# Patient Record
Sex: FEMALE | Race: BLACK OR AFRICAN AMERICAN | Employment: OTHER | URBAN - METROPOLITAN AREA
[De-identification: names, ages, dates, MRNs, and addresses within clinical notes are randomized per-mention and may not be internally consistent; named-entity substitution may affect disease eponyms.]

---

## 2017-12-05 LAB — CREATININE, EXTERNAL: 0.94

## 2018-03-23 ENCOUNTER — OFFICE VISIT (OUTPATIENT)
Dept: ENDOCRINOLOGY | Age: 83
End: 2018-03-23

## 2018-03-23 VITALS
BODY MASS INDEX: 27.19 KG/M2 | SYSTOLIC BLOOD PRESSURE: 134 MMHG | TEMPERATURE: 97.5 F | RESPIRATION RATE: 14 BRPM | WEIGHT: 144 LBS | DIASTOLIC BLOOD PRESSURE: 64 MMHG | HEART RATE: 63 BPM | HEIGHT: 61 IN

## 2018-03-23 DIAGNOSIS — E04.2 MULTINODULAR GOITER: Primary | ICD-10-CM

## 2018-03-23 DIAGNOSIS — R79.89 LOW TSH LEVEL: ICD-10-CM

## 2018-03-23 RX ORDER — DILTIAZEM HYDROCHLORIDE 90 MG/1
90 TABLET, FILM COATED ORAL 2 TIMES DAILY
COMMUNITY

## 2018-03-23 NOTE — PROGRESS NOTES
HISTORY OF PRESENT ILLNESS  Jinny Matthew is a 80 y.o. female. H[facility]    [facility]     Referred by [facility]     Pt has had goiter for several years and is here for Low TSh 0.3     Pt denies any family h/o thyroid problems   She denies any hyper or hypothyroid symptoms   She denies any prior irradiations or exposure to contrast       She had partial thyroidectomy 25 years ago in [facility]   Then she noticed growth and she had a f/u with dr. Hernesto Avelar 3 years ago       She does nto have any compressive symptoms             Past Medical History:   Diagnosis Date    Diverticulitis     Goiter     HTN (hypertension)        Social History     Social History    Marital status:      Spouse name: N/A    Number of children: N/A    Years of education: N/A     Occupational History    Not on file. Social History Main Topics    Smoking status: Never Smoker    Smokeless tobacco: Never Used    Alcohol use No    Drug use: No    Sexual activity: Not on file     Other Topics Concern    Not on file     Social History Narrative    No narrative on file       History reviewed. No pertinent family history. Review of Systems   Constitutional: Negative. HENT: Negative. Eyes: Negative for pain and redness. Respiratory: Negative. Cardiovascular: Negative for chest pain, palpitations and leg swelling. Gastrointestinal: Negative. Negative for constipation. Genitourinary: Negative. Musculoskeletal: Negative for myalgias. Skin: Negative. Neurological: Negative. Endo/Heme/Allergies: Negative. Psychiatric/Behavioral: Negative for depression and memory loss. The patient does not have insomnia. Physical Exam   Constitutional: She is oriented to person, place, and time. She appears well-developed and well-nourished. HENT:   Head: Normocephalic. Eyes: Conjunctivae and EOM are normal. Pupils are equal, round, and reactive to light. Neck: Normal range of motion. Neck supple. No JVD present. No tracheal deviation present. Thyromegaly (she has right SCM pushed to right - ) present. Cardiovascular: Normal rate, regular rhythm and normal heart sounds. No murmur heard. Pulmonary/Chest: Breath sounds normal.   Abdominal: Soft. Bowel sounds are normal.   Musculoskeletal: Normal range of motion. Lymphadenopathy:     She has no cervical adenopathy. Neurological: She is alert and oriented to person, place, and time. She has normal reflexes. Skin: Skin is warm. Psychiatric: She has a normal mood and affect. TSH is 0.3    ASSESSMENT and PLAN    1. Multinodolar goiter   : right sided enlarged goiter   Likely, she had left side removed     Reviewed surgery notes from 1984 ( pt preserved from then on - she had nodules on left side and likely got operated on left side )     We discussed the natural history of thyroid nodules and the fact that only 5% are malignant. I offered her 3 options: a) pursuing a fine needle aspiration of the nodule, b) expectant management  or c) surgical removal of the goiter. Given that she is not having any compressive symptoms from the nodule, there is no indication to go straight to surgery. Pt herself is nto inclined to get surgery done. This goiter is growing outwards     I reassured her that most thyroid cancers are very slow growing . she decided to pursue an expectant approach and consider biopsy at this time and I think this is a perfectly reasonable option.   She had FNA in MAY  2015 by Dr. Florestine Shone - will obtain path results and thyroid usg     My plan is to get CT neck, thryodi uptake and scan and thyrodi usg after the review of above info     LOW TSH of 0.3 is expected with goiter       > 50 % of time is spent on counseling   Patient voiced understanding her plan of care

## 2018-03-23 NOTE — MR AVS SNAPSHOT
[address] [facility] [address] 
[phone] Patient: [facility] MRN: [id number] KRN:[id number] Visit Information Date & Time Provider Department Dept. Phone Encounter #  
 3/23/2018 11:00 AM Lewanda Lundborg, MD [facility] [phone] [id number] Follow-up Instructions Return in about 2 months (around 5/23/2018). Upcoming Health Maintenance Date Due DTaP/Tdap/Td series (1 - Tdap) 9/6/1956 ZOSTER VACCINE AGE 60> 7/6/1995 GLAUCOMA SCREENING Q2Y 9/6/2000 Bone Densitometry (Dexa) Screening 9/6/2000 Pneumococcal 65+ Low/Medium Risk (1 of 2 - PCV13) 9/6/2000 Influenza Age 5 to Adult 8/1/2017 MEDICARE YEARLY EXAM 3/14/2018 Allergies as of 3/23/2018  Review Complete On: 3/23/2018 By: Lewanda Lundborg, MD  
 No Known Allergies Current Immunizations  Never Reviewed No immunizations on file. Not reviewed this visit You Were Diagnosed With   
  
 Codes Comments Multinodular goiter    -  Primary ICD-10-CM: E04.2 ICD-9-CM: 025. 1 Vitals BP Pulse Temp Resp Height(growth percentile) Weight(growth percentile) 134/64 (BP 1 Location: Right arm, BP Patient Position: Sitting) 63 97.5 °F (36.4 °C) (Oral) 14 5' 1\" (1.549 m) 144 lb (65.3 kg) BMI Smoking Status 27.21 kg/m2 Never Smoker BMI and BSA Data Body Mass Index Body Surface Area  
 27.21 kg/m 2 1.68 m 2 Your Updated Medication List  
  
   
This list is accurate as of 3/23/18 12:13 PM.  Always use your most recent med list.  
  
  
  
  
 dilTIAZem 90 mg tablet Commonly known as:  CARDIZEM Take 90 mg by mouth two (2) times a day. Follow-up Instructions Return in about 2 months (around 5/23/2018). To-Do List   
 03/23/2018 Imaging:  CT NECK SOFT TISSUE WO CONT   
  
 03/23/2018 Imaging:  NM THYROID IMAGE UPT SNGL/MULTI   
  
 03/23/2018 Imaging:  US THYROID/PARATHYROID/SOFT TISS Introducing [facility]! Fabian Romero introduces Visante patient portal. Now you can access parts of your medical record, email your doctor's office, and request medication refills online. 1. In your internet browser, go to [email or web address] 2. Click on the First Time User? Click Here link in the Sign In box. You will see the New Member Sign Up page. 3. Enter your Visante Access Code exactly as it appears below. You will not need to use this code after youve completed the sign-up process. If you do not sign up before the expiration date, you must request a new code. · Visante Access Code: [email or web address] Expires: 6/21/2018 12:08 PM 
 
4. Enter the last four digits of your Social Security Number (xxxx) and Date of Birth (mm/dd/yyyy) as indicated and click Submit. You will be taken to the next sign-up page. 5. Create a Visante ID. This will be your Visante login ID and cannot be changed, so think of one that is secure and easy to remember. 6. Create a Visante password. You can change your password at any time. 7. Enter your Password Reset Question and Answer. This can be used at a later time if you forget your password. 8. Enter your e-mail address. You will receive e-mail notification when new information is available in [address] 9. Click Sign Up. You can now view and download portions of your medical record. 10. Click the Download Summary menu link to download a portable copy of your medical information. If you have questions, please visit the Frequently Asked Questions section of the Visante website. Remember, Visante is NOT to be used for urgent needs. For medical emergencies, dial 911. Now available from your iPhone and Android! Please provide this summary of care documentation to your next provider. Your primary care clinician is listed as Wilton Pennington.  If you have any questions after today's visit, please call [phone].

## 2018-03-23 NOTE — LETTER
3/24/2018 9:06 PM 
 
Patient: [facility] YOB: 1935 Date of Visit: 3/23/2018 Dear Perez Cao MD 
[address] [facility] [address] VIA Facsimile: [phone] 
 : Thank you for referring Ms. [facility] to me for evaluation/treatment. Below are the relevant portions of my assessment and plan of care. Chief Complaint Patient presents with  New Patient  Thyroid Problem HISTORY OF PRESENT ILLNESS [facility] is a 80 y.o. female. HPI Initla visit for thyroid goiter management Referred by pcp Pt has had goiter for several years and is here for Low TSh 0.3 Pt denies any family h/o thyroid problems She denies any hyper or hypothyroid symptoms She denies any prior irradiations or exposure to contrast  
 
 
She had partial thyroidectomy 25 years ago in [facility] Then she noticed growth and she had a f/u with dr. Mary Wallis 3 years ago She does nto have any compressive symptoms Past Medical History:  
Diagnosis Date  Diverticulitis  Goiter  HTN (hypertension) Social History Social History  Marital status:  Spouse name: N/A  
 Number of children: N/A  
 Years of education: N/A Occupational History  Not on file. Social History Main Topics  Smoking status: Never Smoker  Smokeless tobacco: Never Used  Alcohol use No  
 Drug use: No  
 Sexual activity: Not on file Other Topics Concern  Not on file Social History Narrative  No narrative on file History reviewed. No pertinent family history. Review of Systems Constitutional: Negative. HENT: Negative. Eyes: Negative for pain and redness. Respiratory: Negative. Cardiovascular: Negative for chest pain, palpitations and leg swelling. Gastrointestinal: Negative. Negative for constipation. Genitourinary: Negative. Musculoskeletal: Negative for myalgias. Skin: Negative. Neurological: Negative. Endo/Heme/Allergies: Negative. Psychiatric/Behavioral: Negative for depression and memory loss. The patient does not have insomnia. Physical Exam  
Constitutional: She is oriented to person, place, and time. She appears well-developed and well-nourished. HENT:  
Head: Normocephalic. Eyes: Conjunctivae and EOM are normal. Pupils are equal, round, and reactive to light. Neck: Normal range of motion. Neck supple. No JVD present. No tracheal deviation present. Thyromegaly (she has right SCM pushed to right - ) present. Cardiovascular: Normal rate, regular rhythm and normal heart sounds. No murmur heard. Pulmonary/Chest: Breath sounds normal.  
Abdominal: Soft. Bowel sounds are normal.  
Musculoskeletal: Normal range of motion. Lymphadenopathy:  
  She has no cervical adenopathy. Neurological: She is alert and oriented to person, place, and time. She has normal reflexes. Skin: Skin is warm. Psychiatric: She has a normal mood and affect. TSH is 0.3 ASSESSMENT and PLAN 1. Multinodolar goiter   : right sided enlarged goiter Likely, she had left side removed Reviewed surgery notes from 1984 ( pt preserved from then on - she had nodules on left side and likely got operated on left side ) We discussed the natural history of thyroid nodules and the fact that only 5% are malignant. I offered her 3 options: a) pursuing a fine needle aspiration of the nodule, b) expectant management  or c) surgical removal of the goiter. Given that she is not having any compressive symptoms from the nodule, there is no indication to go straight to surgery. Pt herself is nto inclined to get surgery done. This goiter is growing outwards I reassured her that most thyroid cancers are very slow growing . she decided to pursue an expectant approach and consider biopsy at this time and I think this is a perfectly reasonable option.   She had FNA in MAY 2015 by Dr. Tegan Cotter - will obtain path results and thyroid usg My plan is to get CT neck, thryodi uptake and scan and thyrodi usg after the review of above info LOW TSH of 0.3 is expected with goiter  
 
 
> 50 % of time is spent on counseling Patient voiced understanding her plan of care If you have questions, please do not hesitate to call me. I look forward to following Ms. Mcmahon along with you. Sincerely, Melinda Rodgers MD

## 2018-04-10 ENCOUNTER — TELEPHONE (OUTPATIENT)
Dept: ENDOCRINOLOGY | Age: 83
End: 2018-04-10

## 2018-04-10 NOTE — TELEPHONE ENCOUNTER
----- Message from Louisa Salazar sent at 4/10/2018 11:20 AM EDT -----  Regarding: Dr. Brennon Conklin is requesting a call back regarding the thyroid scan and CT scan. Pt contact [phone].

## 2018-04-12 NOTE — TELEPHONE ENCOUNTER
Patient is not sure where she had the test completed. She will look through her medical records and call back with information.

## 2018-04-13 NOTE — TELEPHONE ENCOUNTER
Patient called back stating BX was done with Dr. Guille Raya office on 5/15/2015 and Scan was done at [facility] on 3/30/2015. Sent medical request to both facilty.

## 2018-04-18 NOTE — TELEPHONE ENCOUNTER
Finally, I did receive the records from [facility] on this patient. Ultrasonogram from March 30, 2015 :      she has atrophic left thyroid lobe. She has large right thyroid lobe, heterogeneous with numerous microcalcifications and there was a recommendation for FNA. No dominant nodule was seen. Patient also had FNA on May 15, 2015 and it was benign      I recommend this patient to undergo another ultrasonogram which would be a comparison from before. I can perform the ultrasonogram and the biopsy if needed on one of the Wednesday afternoons.     Can be scheduled for it as a routine

## 2018-06-22 LAB
CREATININE, EXTERNAL: 1.12
LDL-C, EXTERNAL: 90

## 2018-06-26 ENCOUNTER — OFFICE VISIT (OUTPATIENT)
Dept: ENDOCRINOLOGY | Age: 83
End: 2018-06-26

## 2018-06-26 VITALS
HEART RATE: 61 BPM | BODY MASS INDEX: 26.94 KG/M2 | SYSTOLIC BLOOD PRESSURE: 144 MMHG | HEIGHT: 61 IN | RESPIRATION RATE: 18 BRPM | TEMPERATURE: 97.2 F | DIASTOLIC BLOOD PRESSURE: 72 MMHG | OXYGEN SATURATION: 96 % | WEIGHT: 142.7 LBS

## 2018-06-26 DIAGNOSIS — E04.2 MULTINODULAR GOITER (NONTOXIC): Primary | ICD-10-CM

## 2018-06-26 RX ORDER — CIPROFLOXACIN 500 MG/1
500 TABLET ORAL 2 TIMES DAILY
COMMUNITY
Start: 2018-06-25 | End: 2018-07-02

## 2018-06-26 NOTE — MR AVS SNAPSHOT
[address] [facility] [address] 
[phone] Patient: [facility] MRN: [id number] FGI:[id number] Visit Information Date & Time Provider Department Dept. Phone Encounter #  
 6/26/2018  9:30 AM Monica Benitez MD [facility] Diabetes [facility] Endocrinology [phone] [id number] Follow-up Instructions Return in about 6 months (around 12/26/2018). Upcoming Health Maintenance Date Due DTaP/Tdap/Td series (1 - Tdap) 9/6/1956 ZOSTER VACCINE AGE 60> 7/6/1995 GLAUCOMA SCREENING Q2Y 9/6/2000 Bone Densitometry (Dexa) Screening 9/6/2000 Pneumococcal 65+ Low/Medium Risk (1 of 2 - PCV13) 9/6/2000 MEDICARE YEARLY EXAM 3/14/2018 Influenza Age 5 to Adult 8/1/2018 Allergies as of 6/26/2018  Review Complete On: 6/26/2018 By: Monica Benitez MD  
 No Known Allergies Current Immunizations  Never Reviewed No immunizations on file. Not reviewed this visit You Were Diagnosed With   
  
 Codes Comments Multinodular goiter (nontoxic)    -  Primary ICD-10-CM: U95.2 ICD-9-CM: 779. 1 Vitals BP Pulse Temp Resp Height(growth percentile) Weight(growth percentile) 144/72 (BP 1 Location: Right arm, BP Patient Position: Sitting) 61 97.2 °F (36.2 °C) (Oral) 18 5' 1\" (1.549 m) 142 lb 11.2 oz (64.7 kg) SpO2 BMI OB Status Smoking Status 96% 26.96 kg/m2 Postmenopausal Never Smoker BMI and BSA Data Body Mass Index Body Surface Area  
 26.96 kg/m 2 1.67 m 2 Your Updated Medication List  
  
   
This list is accurate as of 6/26/18  9:37 AM.  Always use your most recent med list.  
  
  
  
  
 ciprofloxacin HCl 500 mg tablet Commonly known as:  CIPRO 500 mg two (2) times a day. dilTIAZem 90 mg tablet Commonly known as:  CARDIZEM Take 90 mg by mouth two (2) times a day. Follow-up Instructions Return in about 6 months (around 12/26/2018). To-Do List   
 06/26/2018 Imaging:  CT NECK SOFT TISSUE WO CONT   
  
 06/26/2018 Imaging:  US THYROID/PARATHYROID/SOFT TISS Patient Instructions   
-------------------------------------------------------------------------------------------- Refills    -    please call your pharmacy and have them send us a refill request 
 
Results  -  allow up to a week for lab results to be processed and reviewed. Phone calls  -  Allow upto 24 hrs. for non-urgent calls to be retained Prior authorization - It may take up to 2 weeks to process, depending on your insurance Forms  -  FMLA, DMV, patient assistance, etc. will take up to 2 weeks to process Cancellations - please notify the office in advance if you cannot keep your appointment Samples  - will only be dispensed at visits as supply is limited If you are having a medical emergency call 911 
 
-------------------------------------------------------------------------------------------- Introducing [facility]! Babita Jaime introduces Poachable patient portal. Now you can access parts of your medical record, email your doctor's office, and request medication refills online. 1. In your internet browser, go to [email or web address] 2. Click on the First Time User? Click Here link in the Sign In box. You will see the New Member Sign Up page. 3. Enter your Poachable Access Code exactly as it appears below. You will not need to use this code after youve completed the sign-up process. If you do not sign up before the expiration date, you must request a new code. · Poachable Access Code: [email or web address] Expires: 9/24/2018  9:37 AM 
 
4. Enter the last four digits of your Social Security Number (xxxx) and Date of Birth (mm/dd/yyyy) as indicated and click Submit. You will be taken to the next sign-up page. 5. Create a MarqueeharWebtab ID.  This will be your [employer] login ID and cannot be changed, so think of one that is secure and easy to remember. 6. Create a [employer] password. You can change your password at any time. 7. Enter your Password Reset Question and Answer. This can be used at a later time if you forget your password. 8. Enter your e-mail address. You will receive e-mail notification when new information is available in [address] 9. Click Sign Up. You can now view and download portions of your medical record. 10. Click the Download Summary menu link to download a portable copy of your medical information. If you have questions, please visit the Frequently Asked Questions section of the [employer] website. Remember, [employer] is NOT to be used for urgent needs. For medical emergencies, dial 911. Now available from your iPhone and Android! Please provide this summary of care documentation to your next provider. Your primary care clinician is listed as Marck Mcgraw. If you have any questions after today's visit, please call [phone].

## 2018-06-26 NOTE — PROGRESS NOTES
HISTORY OF PRESENT ILLNESS  Leticia Hughes is a 80 y.o. female. HPI    First  F/u  After initial  visit for thyroid goiter management     She is here to follow up     She has No symptoms   She is confused as to why she is seeing the surgeon and me               Old history :    Referred by pcp     Pt has had goiter for several years and is here for Low TSh 0.3     Pt denies any family h/o thyroid problems   She denies any hyper or hypothyroid symptoms   She denies any prior irradiations or exposure to contrast       She had partial thyroidectomy 25 years ago in [facility]   Then she noticed growth and she had a f/u with dr. Jenny Pan 3 years ago       She does nto have any compressive symptoms             Past Medical History:   Diagnosis Date    Diverticulitis     Goiter     HTN (hypertension)        Social History     Social History    Marital status:      Spouse name: N/A    Number of children: N/A    Years of education: N/A     Occupational History    Not on file. Social History Main Topics    Smoking status: Never Smoker    Smokeless tobacco: Never Used    Alcohol use No    Drug use: No    Sexual activity: Not on file     Other Topics Concern    Not on file     Social History Narrative       History reviewed. No pertinent family history. Review of Systems   Constitutional: Negative. HENT: Negative. Eyes: Negative for pain and redness. Respiratory: Negative. Cardiovascular: Negative for chest pain, palpitations and leg swelling. Gastrointestinal: Negative. Negative for constipation. Genitourinary: Negative. Musculoskeletal: Negative for myalgias. Skin: Negative. Neurological: Negative. Endo/Heme/Allergies: Negative. Psychiatric/Behavioral: Negative for depression and memory loss. The patient does not have insomnia. Physical Exam   Constitutional: She is oriented to person, place, and time. She appears well-developed and well-nourished.    HENT:   Head: Normocephalic. Eyes: Conjunctivae and EOM are normal. Pupils are equal, round, and reactive to light. Neck: Normal range of motion. Neck supple. No JVD present. No tracheal deviation present. Thyromegaly (she has right SCM pushed to right - ) present. Cardiovascular: Normal rate, regular rhythm and normal heart sounds. No murmur heard. Pulmonary/Chest: Breath sounds normal.   Abdominal: Soft. Bowel sounds are normal.   Musculoskeletal: Normal range of motion. Lymphadenopathy:     She has no cervical adenopathy. Neurological: She is alert and oriented to person, place, and time. She has normal reflexes. Skin: Skin is warm. Psychiatric: She has a normal mood and affect. TSH is 0.3    ASSESSMENT and PLAN    1. Multinodolar goiter   : right sided enlarged goiter   Likely, she had left side removed   Reviewed surgeon's hand written  notes from Kirstin Hurtado ( pt preserved from then on - she had nodules on left side and likely got operated on left side )     usg 2015 : left side is atrophic from prior surgery   Right side is grown in large and has micro-calcifications   She had FNA in may 2015 -   Negative for cancer  By Dr. Valeria Maldonado      She repeatedly  Mentions she has no symptoms   Given that she is not having any compressive symptoms from the nodule, there is no indication to go straight to surgery. Pt herself is nto inclined to get surgery done. This goiter is growing outwards     I reassured her that most thyroid cancers are very slow growing . she decided to pursue an expectant approach and  NOT consider biopsy at this time and I think this is a perfectly reasonable option.     My plan is to get CT neck, thryodi uptake and scan and thyrodi usg after the review of above info       She had some questions about my role in her care and I clarified that to her as she saw the surgeon before seeing us     LOW TSH of 0.3 is expected with goiter       F/u in 6 months     > 50 % of time is spent on counseling Patient voiced understanding her plan of care

## 2018-06-26 NOTE — PROGRESS NOTES
Wt Readings from Last 3 Encounters:   06/26/18 142 lb 11.2 oz (64.7 kg)   03/23/18 144 lb (65.3 kg)     Temp Readings from Last 3 Encounters:   06/26/18 97.2 °F (36.2 °C) (Oral)   03/23/18 97.5 °F (36.4 °C) (Oral)     BP Readings from Last 3 Encounters:   06/26/18 144/72   03/23/18 134/64     Pulse Readings from Last 3 Encounters:   06/26/18 61   03/23/18 63

## 2018-08-20 ENCOUNTER — TELEPHONE (OUTPATIENT)
Dept: ENDOCRINOLOGY | Age: 83
End: 2018-08-20

## 2018-08-20 NOTE — TELEPHONE ENCOUNTER
Ask for details - why does she want to speak to me ?     I requested based on my notes - Ct neck and thyrodi uptake and scan   She can got to any hospital to get these done , no problem     Infact, [facility] would be helpful because I can see the results

## 2018-08-20 NOTE — TELEPHONE ENCOUNTER
Patient would like to speak with Dr. Johnson Brown.  Jesika Dawson scheduled for [facility] is not covered at [facility].   ONeeds to go to [facility], would like to discuss with Dr. Johnson Brown.

## 2018-08-20 NOTE — TELEPHONE ENCOUNTER
Verified   Calling patien to gather more information in regards to upcomiing CT scan. Patient states that she will have Ultrasound done tomorrow at [facility]. She thinks BS is too expensice and wants to go to someplace that does sccannign and imaging it will be cheaper she thinks. She has/had [facility] She is senior citizen with little to no money paitent states. Explained to patient that [facility] will be better but writer understands about her finance issues.

## 2018-08-21 ENCOUNTER — OP HISTORICAL/CONVERTED ENCOUNTER (OUTPATIENT)
Dept: OTHER | Age: 83
End: 2018-08-21

## 2018-08-27 ENCOUNTER — DOCUMENTATION ONLY (OUTPATIENT)
Dept: ENDOCRINOLOGY | Age: 83
End: 2018-08-27

## 2018-08-27 NOTE — PROGRESS NOTES
Verified   Informed patient of Dr [employer] notes  Patient states she had a thyroid scan done a while back,had a biopsy done no more than 2yrs ago. Patient states\" do we have to do another one? I just did one\" Patient has some concerns. Dr Gail Watkins in [facility] did her biopsy and that  is closer to her house. Patient voiced understanding.

## 2018-08-27 NOTE — PROGRESS NOTES
Reviewed usg from  Aug 21 2018  From [facility]    Please discuss the following info carefully with pt       The right thyrodi lobe is grossly enlarged , but without a single mass      She had another usg in 2016 at [facility] which was better read than this time       In any case, she has not had thyroid scan I think -- check with her ; also, I am missing on CT neck     As the tests are done at [facility], likely they come piece meal     But let her know that my option is to do a blind biopsy ( which means it is pointed on to right thyroid side, but not onto a nodule like we usually do )    The right side is so huge, that she could be having micro cancers and even if I do the above, the needle may not reach the sites - so we still could be in limbo     Better to check rather than not is my impression

## 2018-09-07 DIAGNOSIS — E04.2 MULTINODULAR GOITER: ICD-10-CM

## 2018-12-18 ENCOUNTER — OFFICE VISIT (OUTPATIENT)
Dept: ENDOCRINOLOGY | Age: 83
End: 2018-12-18

## 2018-12-18 VITALS
DIASTOLIC BLOOD PRESSURE: 49 MMHG | OXYGEN SATURATION: 98 % | RESPIRATION RATE: 18 BRPM | TEMPERATURE: 97.4 F | BODY MASS INDEX: 26.96 KG/M2 | HEART RATE: 71 BPM | HEIGHT: 61 IN | SYSTOLIC BLOOD PRESSURE: 129 MMHG | WEIGHT: 142.8 LBS

## 2018-12-18 DIAGNOSIS — E04.2 MULTINODULAR GOITER (NONTOXIC): Primary | ICD-10-CM

## 2018-12-18 NOTE — PROGRESS NOTES
1. Have you been to the ER, urgent care clinic since your last visit? Hospitalized since your last visit? No    2. Have you seen or consulted any other health care providers outside of the [address] [place] [address] since your last visit? Include any pap smears or colon screening.  No     Chief Complaint   Patient presents with    Thyroid Problem     follow up       Wt Readings from Last 3 Encounters:   12/18/18 142 lb 12.8 oz (64.8 kg)   06/26/18 142 lb 11.2 oz (64.7 kg)   03/23/18 144 lb (65.3 kg)     Temp Readings from Last 3 Encounters:   12/18/18 97.4 °F (36.3 °C) (Oral)   06/26/18 97.2 °F (36.2 °C) (Oral)   03/23/18 97.5 °F (36.4 °C) (Oral)     BP Readings from Last 3 Encounters:   12/18/18 129/49   06/26/18 144/72   03/23/18 134/64     Pulse Readings from Last 3 Encounters:   12/18/18 71   06/26/18 61   03/23/18 63

## 2018-12-18 NOTE — PROGRESS NOTES
HISTORY OF PRESENT ILLNESS  Ashu Murillo is a 80 y.o. female. HPI     F/u  After last  visit for thyroid goiter management     She had usg thyroid   She has No symptoms         Old history :    Referred by pcp     Pt has had goiter for several years and is here for Low TSh 0.3     Pt denies any family h/o thyroid problems   She denies any hyper or hypothyroid symptoms   She denies any prior irradiations or exposure to contrast       She had partial thyroidectomy [facility] years ago in [facility]   Then she noticed growth and she had a f/u with dr. Gurrola Lat 3 years ago       She does nto have any compressive symptoms             Past Medical History:   Diagnosis Date    Diverticulitis     Goiter     HTN (hypertension)        Social History     Socioeconomic History    Marital status:      Spouse name: Not on file    Number of children: Not on file    Years of education: Not on file    Highest education level: Not on file   Social Needs    Financial resource strain: Not on file    Food insecurity - worry: Not on file    Food insecurity - inability: Not on file   enrich-in needs - medical: Not on file   enrich-in needs - non-medical: Not on file   Occupational History    Not on file   Tobacco Use    Smoking status: Never Smoker    Smokeless tobacco: Never Used   Substance and Sexual Activity    Alcohol use: No    Drug use: No    Sexual activity: Not on file   Other Topics Concern    Not on file   Social History Narrative    Not on file       History reviewed. No pertinent family history. Review of Systems   Constitutional: Negative. HENT: Negative. Eyes: Negative for pain and redness. Respiratory: Negative. Cardiovascular: Negative for chest pain, palpitations and leg swelling. Gastrointestinal: Negative. Negative for constipation. Genitourinary: Negative. Musculoskeletal: Negative for myalgias. Skin: Negative. Neurological: Negative.     Endo/Heme/Allergies: Negative. Psychiatric/Behavioral: Negative for depression and memory loss. The patient does not have insomnia. Physical Exam   Constitutional: She is oriented to person, place, and time. She appears well-developed and well-nourished. HENT:   Head: Normocephalic. Eyes: Conjunctivae and EOM are normal. Pupils are equal, round, and reactive to light. Neck: Normal range of motion. Neck supple. No JVD present. No tracheal deviation present. Thyromegaly (she has right SCM pushed to right - ) present. Cardiovascular: Normal rate, regular rhythm and normal heart sounds. No murmur heard. Pulmonary/Chest: Breath sounds normal.   Abdominal: Soft. Bowel sounds are normal.   Musculoskeletal: Normal range of motion. Lymphadenopathy:     She has no cervical adenopathy. Neurological: She is alert and oriented to person, place, and time. She has normal reflexes. Skin: Skin is warm. Psychiatric: She has a normal mood and affect. TSH is 0.3    ASSESSMENT and PLAN    1. Multinodolar goiter   : right sided enlarged goiter   Likely, she had left side removed   Reviewed surgeon's hand written  notes from Kirstin Hurtado ( pt preserved from then on - she had nodules on left side and likely got operated on left side )     usg 2015 : left side is atrophic from prior surgery   Right side is grown in large and has micro-calcifications   She had FNA in may 2015 -   Negative for cancer  By Dr. Gi Barahona      She repeatedly  Mentions she has no symptoms   Given that she is not having any compressive symptoms from the nodule, there is no indication to go straight to surgery. Pt herself is nto inclined to get surgery done. This goiter is growing outwards     I reassured her that most thyroid cancers are very slow growing . she decided to pursue an expectant approach and  NOT consider biopsy at this time and I think this is a perfectly reasonable option.     My plan is to get CT neck, thryodi uptake and scan and thyrodi usg Thyroid usg :  Right lobe is big and without nodule   Pt is explained that the nodule  Is not there to heidi after, and if I do I have to do blind biopsies and if it is negative, still there could a chance of micro cancers   She did nto get CT scan for cost reasons       LOW TSH of 0.3 is expected with goiter       She has cough - I insisted that she gets the CT scan done to better view the tracheal compression      F/u in 12 months     > 50 % of time is spent on counseling   Patient voiced understanding her plan of care

## 2018-12-19 LAB — TSH SERPL DL<=0.005 MIU/L-ACNC: 0.58 UIU/ML (ref 0.45–4.5)

## 2018-12-26 ENCOUNTER — OP HISTORICAL/CONVERTED ENCOUNTER (OUTPATIENT)
Dept: OTHER | Age: 83
End: 2018-12-26

## 2021-06-16 ENCOUNTER — OFFICE VISIT (OUTPATIENT)
Dept: ENDOCRINOLOGY | Age: 86
End: 2021-06-16
Payer: MEDICARE

## 2021-06-16 VITALS
BODY MASS INDEX: 27.68 KG/M2 | WEIGHT: 146.6 LBS | TEMPERATURE: 97.7 F | SYSTOLIC BLOOD PRESSURE: 122 MMHG | OXYGEN SATURATION: 99 % | HEIGHT: 61 IN | DIASTOLIC BLOOD PRESSURE: 72 MMHG | HEART RATE: 71 BPM

## 2021-06-16 DIAGNOSIS — E04.9 GOITER: Primary | ICD-10-CM

## 2021-06-16 PROCEDURE — 99214 OFFICE O/P EST MOD 30 MIN: CPT | Performed by: INTERNAL MEDICINE

## 2021-06-16 RX ORDER — MELATONIN
DAILY
COMMUNITY

## 2021-06-16 NOTE — LETTER
[facility]16/2021 [facility] 1935 Date of Visit: 6/16/2021 Hima Guillermo, [address] Fax: [phone] Dear Hima Guillermo MD, Thank you for referring Ms. JOSELUIS [facility] to [address] [place] [address] ENDOCRINOLOGY for evaluation. My notes for this consultation are attached. If you have questions, please do not hesitate to call me. I look forward to following your patient along with you. Sincerely, Nuha Estrada MD

## 2021-06-16 NOTE — PROGRESS NOTES
History and Physical    Patient: Arnulfo Monet MRN: [id number]  SSN: [id number]    YOB: 1935  Age: 80 y.o. Sex: female      Subjective: Arnulfo Monet is a 80 y.o. female with past medical history of hypertension is sent to me by primary care physician Dr. Davina Solorio for goiter. She was previously seeing endocrinologist Dr. Shah Payment of history:  Patient had a goiter since a long time. She had left lobectomy in 1987 at [facility]. Pathology was apparently benign. Then she was seeing Dr. Patti Zarco for goiter. Right lobe is enlarged and it was showing some microcalcifications for which she had FNA in May 2015 which was resulted as benign. Then she started seeing Dr. Sierra Jones.  Ultrasound was not showing any dominant nodules and patient was not having any obstructive symptoms, so they were continuing with observation. Last ultrasound was in August 2018. No family history of thyroid cancer. Patient's brother recently had thyroidectomy for goiter which was causing obstructive symptoms. There was no cancer in it. No personal history of radiation exposure. Patient denies any difficulty in swallowing, difficulty in breathing, hoarseness of voice. Past Medical History:   Diagnosis Date    Diverticulitis     Goiter     HTN (hypertension)      Past Surgical History:   Procedure Laterality Date    HX CYST REMOVAL      HX HYSTERECTOMY      HX THYROIDECTOMY  1984      Family History   Problem Relation Age of Onset    Thyroid Disease Mother     Cancer Father      Social History     Tobacco Use    Smoking status: Never Smoker    Smokeless tobacco: Never Used   Substance Use Topics    Alcohol use: No      Prior to Admission medications    Medication Sig Start Date End Date Taking? Authorizing Provider   cholecalciferol (Vitamin D3) (1000 Units /25 mcg) tablet Take  by mouth daily. Yes Provider, Historical   dilTIAZem (CARDIZEM) 90 mg tablet Take 90 mg by mouth two (2) times a day.    Yes Provider, Historical        No Known Allergies    Review of Systems:  ROS    A comprehensive review of systems was preformed and it is negative except mentioned in HPI    Objective:     Vitals:    06/16/21 1519 06/16/21 1522   BP: (!) 144/80 122/72   Pulse: 73 71   Temp: 97.7 °F (36.5 °C)    TempSrc: Temporal    SpO2: 99%    Weight: 146 lb 9.6 oz (66.5 kg)    Height: 5' 1\" (1.549 m)         Physical Exam:    Physical Exam  Vitals and nursing note reviewed. Constitutional:       Appearance: Normal appearance. HENT:      Head: Normocephalic and atraumatic. Neck:      Comments: Right thyroid enlarged, no cervical lymphadenopathy  Cardiovascular:      Rate and Rhythm: Normal rate and regular rhythm. Pulmonary:      Effort: Pulmonary effort is normal.      Breath sounds: Normal breath sounds. Musculoskeletal:      Cervical back: Neck supple. Neurological:      Mental Status: She is alert. Labs and Imaging:    Last 3 Recorded Weights in this Encounter    06/16/21 1519   Weight: 146 lb 9.6 oz (66.5 kg)        No results found for: HBA1C, YFW2BNFT, QSB2KAFC, FSI5ECZE     Assessment:     Patient Active Problem List   Diagnosis Code    Goiter E04.9    HTN (hypertension) I10    Diverticulitis K57.92    Multinodular goiter (nontoxic) E04.2           Plan:     Goiter:  I reviewed labs and notes from the previous endocrinologist.    Patient had left lobectomy in 1984 in [place], apparently there was no cancer in it. Right thyroid has slowly enlarged. Ultrasound 2015 showed right lobe is larger with microcalcifications. She had FNA in May 2015 by Dr. Hardeep Williamson, it was negative for malignant cells. Patient did not have any compressive symptoms so they were doing observation.     Thyroid ultrasound 8-:  S/p left lobectomy  Right lobe is enlarged at least 12 x 6 x 5 cm in size, heterogenous in echotexture with microcystic changes, no dominant nodules    4-:  Normal CMP  TSH normal at 0.62 (0. 454. 5)    Patient is not having any compressive symptoms. Plan:  Check thyroid ultrasound. If there is any dominant nodule I will order biopsy, otherwise I will see her back in 1 year for observation. Advised patient to let me know if she has any change in her symptoms in the meantime. Essential hypertension:  Blood pressure well controlled on current medications.       Orders Placed This Encounter    US THYROID/PARATHYROID/SOFT TISS     Standing Status:   Future     Standing Expiration Date:   7/16/2022        Signed By: Jacek Reyes MD     June 16, 2021      Return to clinic 1 year

## 2021-06-21 ENCOUNTER — HOSPITAL ENCOUNTER (OUTPATIENT)
Dept: ULTRASOUND IMAGING | Age: 86
Discharge: HOME | End: 2021-06-21
Attending: INTERNAL MEDICINE
Payer: MEDICARE

## 2021-06-21 DIAGNOSIS — E04.9 GOITER: ICD-10-CM

## 2021-06-21 PROCEDURE — 76536 US EXAM OF HEAD AND NECK: CPT

## 2021-06-21 NOTE — PROGRESS NOTES
Please inform patient that her thyroid ultrasound is not showing any dominant nodule, so there is no indication for thyroid biopsy. I will see her back in 1 year, but she should let me know if there is any change in her neck symptoms prior to that.

## 2021-08-24 ENCOUNTER — TRANSCRIBE ORDER (OUTPATIENT)
Dept: SCHEDULING | Age: 86
End: 2021-08-24

## 2021-08-24 DIAGNOSIS — Z12.31 VISIT FOR SCREENING MAMMOGRAM: Primary | ICD-10-CM

## 2021-09-21 ENCOUNTER — HOSPITAL ENCOUNTER (OUTPATIENT)
Dept: MAMMOGRAPHY | Age: 86
Discharge: HOME | End: 2021-09-21
Payer: MEDICARE

## 2021-09-21 DIAGNOSIS — Z12.31 VISIT FOR SCREENING MAMMOGRAM: ICD-10-CM

## 2021-09-21 PROCEDURE — 77063 BREAST TOMOSYNTHESIS BI: CPT

## 2021-10-24 ENCOUNTER — HOSPITAL ENCOUNTER (EMERGENCY)
Age: 86
Discharge: HOME | End: 2021-10-24
Attending: EMERGENCY MEDICINE
Payer: MEDICARE

## 2021-10-24 VITALS
SYSTOLIC BLOOD PRESSURE: 176 MMHG | HEART RATE: 58 BPM | TEMPERATURE: 97.7 F | OXYGEN SATURATION: 98 % | DIASTOLIC BLOOD PRESSURE: 79 MMHG | BODY MASS INDEX: 27.19 KG/M2 | HEIGHT: 61 IN | RESPIRATION RATE: 16 BRPM | WEIGHT: 144 LBS

## 2021-10-24 DIAGNOSIS — R43.2 TASTE SENSE ALTERED: Primary | ICD-10-CM

## 2021-10-24 PROCEDURE — 99282 EMERGENCY DEPT VISIT SF MDM: CPT

## 2021-10-24 NOTE — ED PROVIDER NOTES
HPI   Patient reports that for the past several days she has had a \"funny taste in my mouth. \"  She cannot characterize it or describe it but reports that it goes away when she eats or drinks anything. She also reports that she has been sneezing over the last several days. Patient reports she had a new generator installed and fears that she may have been exposed to carbon monoxide causing her symptoms. However, patient reports she has a carbon monoxide monitor that has not alarmed. Denies shortness of breath, cough, constitutional symptoms. Past Medical History:   Diagnosis Date    Diverticulitis     Goiter     HTN (hypertension)        Past Surgical History:   Procedure Laterality Date    HX BREAST BIOPSY Left     benign    HX CYST REMOVAL      HX HYSTERECTOMY      HX THYROIDECTOMY  1984         Family History:   Problem Relation Age of Onset    Thyroid Disease Mother     Cancer Father        Social History     Socioeconomic History    Marital status:      Spouse name: Not on file    Number of children: Not on file    Years of education: Not on file    Highest education level: Not on file   Occupational History    Not on file   Tobacco Use    Smoking status: Never Smoker    Smokeless tobacco: Never Used   Vaping Use    Vaping Use: Never used   Substance and Sexual Activity    Alcohol use: No    Drug use: No    Sexual activity: Not on file   Other Topics Concern    Not on file   Social History Narrative    Not on file     Social Determinants of Health     Financial Resource Strain:     Difficulty of Paying Living Expenses:    Food Insecurity:     Worried About Running Out of Food in the Last Year:     Ran Out of Food in the Last Year:    Transportation Needs:     Lack of Transportation (Medical):      Lack of Transportation (Non-Medical):    Physical Activity:     Days of Exercise per Week:     Minutes of Exercise per Session:    Stress:     Feeling of Stress :    Social Connections:     Frequency of Communication with Friends and Family:     Frequency of Social Gatherings with Friends and Family:     Attends Buddhist Services:     Active Member of Clubs or Organizations:     Attends Club or Organization Meetings:     Marital Status:    Intimate Partner Violence:     Fear of Current or Ex-Partner:     Emotionally Abused:     Physically Abused:     Sexually Abused: ALLERGIES: Patient has no known allergies. Review of Systems   Constitutional: Negative. HENT: Positive for sneezing. Eyes: Negative. Respiratory: Negative. Cardiovascular: Negative. Gastrointestinal: Negative. Endocrine: Negative. Genitourinary: Negative. Musculoskeletal: Negative. Allergic/Immunologic: Negative. Neurological: Negative. Hematological: Negative. Psychiatric/Behavioral: Negative. All other systems reviewed and are negative. Vitals:    10/24/21 0751 10/24/21 0759   BP: (!) 176/79    Pulse: (!) 58    Resp: 16    Temp: 97.7 °F (36.5 °C)    SpO2: 98% 98%   Weight: 65.3 kg (144 lb)    Height: 5' 1\" (1.549 m)             Physical Exam  Vitals and nursing note reviewed. Constitutional:       General: She is not in acute distress. Appearance: Normal appearance. She is normal weight. She is not ill-appearing, toxic-appearing or diaphoretic. HENT:      Head: Normocephalic and atraumatic. Nose: Nose normal.      Mouth/Throat:      Mouth: Mucous membranes are moist.      Pharynx: Oropharynx is clear. No oropharyngeal exudate or posterior oropharyngeal erythema. Eyes:      Extraocular Movements: Extraocular movements intact. Conjunctiva/sclera: Conjunctivae normal.      Pupils: Pupils are equal, round, and reactive to light. Pulmonary:      Effort: Pulmonary effort is normal. No respiratory distress. Breath sounds: Normal breath sounds. No stridor. No wheezing, rhonchi or rales.    Musculoskeletal:         General: No swelling, tenderness, deformity or signs of injury. Normal range of motion. Cervical back: Normal range of motion and neck supple. No rigidity or tenderness. Right lower leg: No edema. Left lower leg: No edema. Lymphadenopathy:      Cervical: No cervical adenopathy. Skin:     General: Skin is warm and dry. Coloration: Skin is not jaundiced or pale. Findings: No bruising, erythema, lesion or rash. Neurological:      General: No focal deficit present. Mental Status: She is alert and oriented to person, place, and time. Mental status is at baseline. Cranial Nerves: No cranial nerve deficit. Sensory: No sensory deficit. Motor: No weakness. Coordination: Coordination normal.      Gait: Gait normal.   Psychiatric:         Mood and Affect: Mood normal.         Behavior: Behavior normal.          MDM     Patient is quite anxious and fixated on possible carbon monoxide poisoning causing her symptoms. I assured her that since her monitor has not alarmed and she is not having any symptoms suggestive of carbon monoxide poisoning that she is safe from this. Work-up not indicated. Okay for discharge.   Procedures

## 2021-10-24 NOTE — ED TRIAGE NOTES
Pt had a new generator installed in august that she believes is not installed correctly, pt reported pt reported she can not smell any fumes but she can taste them and they make her sneeze,

## 2021-12-20 ENCOUNTER — OFFICE VISIT (OUTPATIENT)
Dept: GASTROENTEROLOGY | Age: 86
End: 2021-12-20
Payer: MEDICARE

## 2021-12-20 VITALS
OXYGEN SATURATION: 98 % | RESPIRATION RATE: 14 BRPM | WEIGHT: 146.4 LBS | HEART RATE: 66 BPM | HEIGHT: 61 IN | SYSTOLIC BLOOD PRESSURE: 130 MMHG | TEMPERATURE: 97.8 F | BODY MASS INDEX: 27.64 KG/M2 | DIASTOLIC BLOOD PRESSURE: 74 MMHG

## 2021-12-20 DIAGNOSIS — Z86.010 PERSONAL HISTORY OF COLONIC POLYPS: ICD-10-CM

## 2021-12-20 DIAGNOSIS — K57.30 DIVERTICULAR DISEASE OF COLON: ICD-10-CM

## 2021-12-20 DIAGNOSIS — R01.1 HEART MURMUR: ICD-10-CM

## 2021-12-20 DIAGNOSIS — Z86.010 PERSONAL HISTORY OF COLONIC POLYPS: Primary | ICD-10-CM

## 2021-12-20 PROCEDURE — 1101F PT FALLS ASSESS-DOCD LE1/YR: CPT | Performed by: INTERNAL MEDICINE

## 2021-12-20 PROCEDURE — 1090F PRES/ABSN URINE INCON ASSESS: CPT | Performed by: INTERNAL MEDICINE

## 2021-12-20 PROCEDURE — G8427 DOCREV CUR MEDS BY ELIG CLIN: HCPCS | Performed by: INTERNAL MEDICINE

## 2021-12-20 PROCEDURE — G8419 CALC BMI OUT NRM PARAM NOF/U: HCPCS | Performed by: INTERNAL MEDICINE

## 2021-12-20 PROCEDURE — 99214 OFFICE O/P EST MOD 30 MIN: CPT | Performed by: INTERNAL MEDICINE

## 2021-12-20 PROCEDURE — G8510 SCR DEP NEG, NO PLAN REQD: HCPCS | Performed by: INTERNAL MEDICINE

## 2021-12-20 PROCEDURE — G8536 NO DOC ELDER MAL SCRN: HCPCS | Performed by: INTERNAL MEDICINE

## 2021-12-20 RX ORDER — POLYETHYLENE GLYCOL 3350 17 G/17G
POWDER, FOR SOLUTION ORAL
Qty: 510 G | Refills: 0 | Status: SHIPPED | OUTPATIENT
Start: 2021-12-20 | End: 2022-01-13

## 2021-12-20 RX ORDER — ERGOCALCIFEROL 1.25 MG/1
CAPSULE ORAL
COMMUNITY
Start: 2021-11-24

## 2021-12-20 NOTE — PROGRESS NOTES
1. Have you been to the ER, urgent care clinic since your last visit? Hospitalized since your last visit? Yes  , October 2021   Smelled fumes    2. Have you seen or consulted any other health care providers outside of the [address] [place] [address] since your last visit? Include any pap smears or colon screening.   No   Chief Complaint   Patient presents with    Colon Polyps     Visit Vitals  /74 (BP 1 Location: Left upper arm, BP Patient Position: Sitting, BP Cuff Size: Adult)   Pulse 66   Temp 97.8 °F (36.6 °C) (Temporal)   Resp 14   Ht 5' 1\" (1.549 m)   Wt 66.4 kg (146 lb 6.4 oz)   SpO2 98% Comment: room air   BMI 27.66 kg/m²

## 2021-12-20 NOTE — H&P (VIEW-ONLY)
Wilton Campos is a 80 y.o. female who presents today for the following:  Chief Complaint   Patient presents with    Colon Polyps         No Known Allergies    Current Outpatient Medications   Medication Sig    ergocalciferol (ERGOCALCIFEROL) 1,250 mcg (50,000 unit) capsule Once a month    polyethylene glycol (MIRALAX) 17 gram/dose powder Use as directed  Indications: emptying of the bowel    dilTIAZem (CARDIZEM) 90 mg tablet Take 90 mg by mouth two (2) times a day.  cholecalciferol (Vitamin D3) (1000 Units /25 mcg) tablet Take  by mouth daily. (Patient not taking: Reported on 12/20/2021)     No current facility-administered medications for this visit.        Past Medical History:   Diagnosis Date    Diverticulitis     Goiter     Heart murmur 12/20/2021    HTN (hypertension)     Personal history of colonic polyps 12/20/2021       Past Surgical History:   Procedure Laterality Date    COLONOSCOPY  2018    colon polyps    COLONOSCOPY  2012    HX BREAST BIOPSY Left     benign    HX CYST REMOVAL      HX HYSTERECTOMY      HX THYROIDECTOMY  1984    had goiter removed       Family History   Problem Relation Age of Onset    Thyroid Disease Mother     Cancer Father     Prostate Cancer Father        Social History     Socioeconomic History    Marital status:      Spouse name: Not on file    Number of children: Not on file    Years of education: Not on file    Highest education level: Not on file   Occupational History    Not on file   Tobacco Use    Smoking status: Never Smoker    Smokeless tobacco: Never Used   Vaping Use    Vaping Use: Never used   Substance and Sexual Activity    Alcohol use: No    Drug use: No    Sexual activity: Not on file   Other Topics Concern    Not on file   Social History Narrative    Not on file     Social Determinants of Health     Financial Resource Strain:     Difficulty of Paying Living Expenses: Not on file   Food Insecurity:     Worried About Running Out of Food in the Last Year: Not on file    Ran Out of Food in the Last Year: Not on file   Transportation Needs:     Lack of Transportation (Medical): Not on file    Lack of Transportation (Non-Medical): Not on file   Physical Activity:     Days of Exercise per Week: Not on file    Minutes of Exercise per Session: Not on file   Stress:     Feeling of Stress : Not on file   Social Connections:     Frequency of Communication with Friends and Family: Not on file    Frequency of Social Gatherings with Friends and Family: Not on file    Attends Adventist Services: Not on file    Active Member of [address] [place] [address] [employer] or Organizations: Not on file    Attends Club or Organization Meetings: Not on file    Marital Status: Not on file   Intimate Partner Violence:     Fear of Current or Ex-Partner: Not on file    Emotionally Abused: Not on file    Physically Abused: Not on file    Sexually Abused: Not on file   Housing Stability:     Unable to Pay for Housing in the Last Year: Not on file    Number of [place] in the Last Year: Not on file    Unstable Housing in the Last Year: Not on file         HPI  71-year-old female with history of hypertension, thyroid goiter, diverticular disease, and colon polyps who comes in for evaluation. Patient last had a colonoscopy on 7/24/2018 which showed a tubular adenoma in the ascending colon, generalized diverticulosis, and a stenotic sigmoid colon. Patient states her only abdominal issue is increased gas. She is eating well and has good appetite. Her bowel movements are usually daily and formed. No gross GI bleeding. Stable weight. Review of Systems   Constitutional: Negative. HENT: Negative. Negative for nosebleeds. Eyes: Negative. Respiratory: Negative. Cardiovascular: Negative. Gastrointestinal: Negative. Negative for abdominal pain, blood in stool, constipation, diarrhea, heartburn, melena, nausea and vomiting. Genitourinary: Negative. Musculoskeletal: Negative. Skin: Negative. Neurological: Negative. Endo/Heme/Allergies: Negative. Psychiatric/Behavioral: Negative. All other systems reviewed and are negative. Visit Vitals  /74 (BP 1 Location: Left upper arm, BP Patient Position: Sitting, BP Cuff Size: Adult)   Pulse 66   Temp 97.8 °F (36.6 °C) (Temporal)   Resp 14   Ht 5' 1\" (1.549 m)   Wt 66.4 kg (146 lb 6.4 oz)   SpO2 98% Comment: room air   BMI 27.66 kg/m²     Physical Exam  Vitals and nursing note reviewed. Constitutional:       Appearance: Normal appearance. HENT:      Head: Normocephalic and atraumatic. Nose: Nose normal.      Mouth/Throat:      Mouth: Mucous membranes are moist.      Pharynx: Oropharynx is clear. Eyes:      General: No scleral icterus. Conjunctiva/sclera: Conjunctivae normal.      Pupils: Pupils are equal, round, and reactive to light. Cardiovascular:      Rate and Rhythm: Normal rate and regular rhythm. Pulses: Normal pulses. Heart sounds: Normal heart sounds. Pulmonary:      Effort: Pulmonary effort is normal.      Breath sounds: Normal breath sounds. Abdominal:      General: Bowel sounds are normal. There is no distension. Palpations: Abdomen is soft. There is no mass. Tenderness: There is no abdominal tenderness. There is no right CVA tenderness, left CVA tenderness, guarding or rebound. Hernia: No hernia is present. Musculoskeletal:         General: Normal range of motion. Cervical back: Normal range of motion and neck supple. Skin:     General: Skin is warm and dry. Coloration: Skin is not jaundiced. Neurological:      General: No focal deficit present. Mental Status: She is alert and oriented to person, place, and time. Psychiatric:         Mood and Affect: Mood normal.         Behavior: Behavior normal.         Thought Content:  Thought content normal.         Judgment: Judgment normal.            1. Personal history of colonic polyps  We will schedule for a surveillance colonoscopy. - COLONOSCOPY,DIAGNOSTIC; Future  - polyethylene glycol (MIRALAX) 17 gram/dose powder; Use as directed  Indications: emptying of the bowel  Dispense: 510 g; Refill: 0    2.  Diverticular disease of colon    - COLONOSCOPY,DIAGNOSTIC; Future  - polyethylene glycol (MIRALAX) 17 gram/dose powder; Use as directed  Indications: emptying of the bowel  Dispense: 510 g; Refill: 0

## 2021-12-20 NOTE — PROGRESS NOTES
Danielle Flores is a 80 y.o. female who presents today for the following:  Chief Complaint   Patient presents with    Colon Polyps         No Known Allergies    Current Outpatient Medications   Medication Sig    ergocalciferol (ERGOCALCIFEROL) 1,250 mcg (50,000 unit) capsule Once a month    polyethylene glycol (MIRALAX) 17 gram/dose powder Use as directed  Indications: emptying of the bowel    dilTIAZem (CARDIZEM) 90 mg tablet Take 90 mg by mouth two (2) times a day.  cholecalciferol (Vitamin D3) (1000 Units /25 mcg) tablet Take  by mouth daily. (Patient not taking: Reported on 12/20/2021)     No current facility-administered medications for this visit.        Past Medical History:   Diagnosis Date    Diverticulitis     Goiter     Heart murmur 12/20/2021    HTN (hypertension)     Personal history of colonic polyps 12/20/2021       Past Surgical History:   Procedure Laterality Date    COLONOSCOPY  2018    colon polyps    COLONOSCOPY  2012    HX BREAST BIOPSY Left     benign    HX CYST REMOVAL      HX HYSTERECTOMY      HX THYROIDECTOMY  1984    had goiter removed       Family History   Problem Relation Age of Onset    Thyroid Disease Mother     Cancer Father     Prostate Cancer Father        Social History     Socioeconomic History    Marital status:      Spouse name: Not on file    Number of children: Not on file    Years of education: Not on file    Highest education level: Not on file   Occupational History    Not on file   Tobacco Use    Smoking status: Never Smoker    Smokeless tobacco: Never Used   Vaping Use    Vaping Use: Never used   Substance and Sexual Activity    Alcohol use: No    Drug use: No    Sexual activity: Not on file   Other Topics Concern    Not on file   Social History Narrative    Not on file     Social Determinants of Health     Financial Resource Strain:     Difficulty of Paying Living Expenses: Not on file   Food Insecurity:     Worried About Running Out of Food in the Last Year: Not on file    Ran Out of Food in the Last Year: Not on file   Transportation Needs:     Lack of Transportation (Medical): Not on file    Lack of Transportation (Non-Medical): Not on file   Physical Activity:     Days of Exercise per Week: Not on file    Minutes of Exercise per Session: Not on file   Stress:     Feeling of Stress : Not on file   Social Connections:     Frequency of Communication with Friends and Family: Not on file    Frequency of Social Gatherings with Friends and Family: Not on file    Attends [employer] Services: Not on file    Active Member of [address] [place] [address] [id number] or Organizations: Not on file    Attends Club or Organization Meetings: Not on file    Marital Status: Not on file   Intimate Partner Violence:     Fear of Current or Ex-Partner: Not on file    Emotionally Abused: Not on file    Physically Abused: Not on file    Sexually Abused: Not on file   Housing Stability:     Unable to Pay for Housing in the Last Year: Not on file    Number of [place] in the Last Year: Not on file    Unstable Housing in the Last Year: Not on file         HPI  77-year-old female with history of hypertension, thyroid goiter, diverticular disease, and colon polyps who comes in for evaluation. Patient last had a colonoscopy on 7/24/2018 which showed a tubular adenoma in the ascending colon, generalized diverticulosis, and a stenotic sigmoid colon. Patient states her only abdominal issue is increased gas. She is eating well and has good appetite. Her bowel movements are usually daily and formed. No gross GI bleeding. Stable weight. Review of Systems   Constitutional: Negative. HENT: Negative. Negative for nosebleeds. Eyes: Negative. Respiratory: Negative. Cardiovascular: Negative. Gastrointestinal: Negative. Negative for abdominal pain, blood in stool, constipation, diarrhea, heartburn, melena, nausea and vomiting. Genitourinary: Negative. Musculoskeletal: Negative. Skin: Negative. Neurological: Negative. Endo/Heme/Allergies: Negative. Psychiatric/Behavioral: Negative. All other systems reviewed and are negative. Visit Vitals  /74 (BP 1 Location: Left upper arm, BP Patient Position: Sitting, BP Cuff Size: Adult)   Pulse 66   Temp 97.8 °F (36.6 °C) (Temporal)   Resp 14   Ht 5' 1\" (1.549 m)   Wt 66.4 kg (146 lb 6.4 oz)   SpO2 98% Comment: room air   BMI 27.66 kg/m²     Physical Exam  Vitals and nursing note reviewed. Constitutional:       Appearance: Normal appearance. HENT:      Head: Normocephalic and atraumatic. Nose: Nose normal.      Mouth/Throat:      Mouth: Mucous membranes are moist.      Pharynx: Oropharynx is clear. Eyes:      General: No scleral icterus. Conjunctiva/sclera: Conjunctivae normal.      Pupils: Pupils are equal, round, and reactive to light. Cardiovascular:      Rate and Rhythm: Normal rate and regular rhythm. Pulses: Normal pulses. Heart sounds: Normal heart sounds. Pulmonary:      Effort: Pulmonary effort is normal.      Breath sounds: Normal breath sounds. Abdominal:      General: Bowel sounds are normal. There is no distension. Palpations: Abdomen is soft. There is no mass. Tenderness: There is no abdominal tenderness. There is no right CVA tenderness, left CVA tenderness, guarding or rebound. Hernia: No hernia is present. Musculoskeletal:         General: Normal range of motion. Cervical back: Normal range of motion and neck supple. Skin:     General: Skin is warm and dry. Coloration: Skin is not jaundiced. Neurological:      General: No focal deficit present. Mental Status: She is alert and oriented to person, place, and time. Psychiatric:         Mood and Affect: Mood normal.         Behavior: Behavior normal.         Thought Content:  Thought content normal.         Judgment: Judgment normal.            1. Personal history of colonic polyps  We will schedule for a surveillance colonoscopy. - COLONOSCOPY,DIAGNOSTIC; Future  - polyethylene glycol (MIRALAX) 17 gram/dose powder; Use as directed  Indications: emptying of the bowel  Dispense: 510 g; Refill: 0    2.  Diverticular disease of colon    - COLONOSCOPY,DIAGNOSTIC; Future  - polyethylene glycol (MIRALAX) 17 gram/dose powder; Use as directed  Indications: emptying of the bowel  Dispense: 510 g; Refill: 0

## 2021-12-21 NOTE — PROGRESS NOTES
COLONOSCOPY IS SCHEDULED FOR 1- AT 10:00 AM.  COVID TEST IS SCHEDULED FOR 1-  COLONOSCOPY IS APPROVED PER [address]  AUTH # [id number] AT 11:44 AM

## 2022-01-10 ENCOUNTER — HOSPITAL ENCOUNTER (OUTPATIENT)
Dept: PREADMISSION TESTING | Age: 87
Discharge: HOME | End: 2022-01-10
Payer: MEDICARE

## 2022-01-10 LAB
FLUAV RNA SPEC QL NAA+PROBE: NOT DETECTED
FLUBV RNA SPEC QL NAA+PROBE: NOT DETECTED
SARS-COV-2, COV2: NORMAL
SARS-COV-2, COV2: NOT DETECTED

## 2022-01-10 PROCEDURE — 87636 SARSCOV2 & INF A&B AMP PRB: CPT

## 2022-01-13 ENCOUNTER — ANESTHESIA EVENT (OUTPATIENT)
Dept: ENDOSCOPY | Age: 87
End: 2022-01-13
Payer: MEDICARE

## 2022-01-13 ENCOUNTER — HOSPITAL ENCOUNTER (OUTPATIENT)
Age: 87
Setting detail: OUTPATIENT SURGERY
Discharge: HOME | End: 2022-01-13
Attending: INTERNAL MEDICINE | Admitting: INTERNAL MEDICINE
Payer: MEDICARE

## 2022-01-13 ENCOUNTER — ANESTHESIA (OUTPATIENT)
Dept: ENDOSCOPY | Age: 87
End: 2022-01-13
Payer: MEDICARE

## 2022-01-13 VITALS
RESPIRATION RATE: 20 BRPM | SYSTOLIC BLOOD PRESSURE: 159 MMHG | DIASTOLIC BLOOD PRESSURE: 75 MMHG | TEMPERATURE: 98.7 F | BODY MASS INDEX: 26.62 KG/M2 | WEIGHT: 141 LBS | HEIGHT: 61 IN | HEART RATE: 67 BPM | OXYGEN SATURATION: 95 %

## 2022-01-13 PROCEDURE — 77030019988 HC FCPS ENDOSC DISP BSC -B: Performed by: INTERNAL MEDICINE

## 2022-01-13 PROCEDURE — 74011250636 HC RX REV CODE- 250/636

## 2022-01-13 PROCEDURE — 76060000032 HC ANESTHESIA 0.5 TO 1 HR: Performed by: INTERNAL MEDICINE

## 2022-01-13 PROCEDURE — 74011000250 HC RX REV CODE- 250

## 2022-01-13 PROCEDURE — 2709999900 HC NON-CHARGEABLE SUPPLY: Performed by: INTERNAL MEDICINE

## 2022-01-13 PROCEDURE — 74011250636 HC RX REV CODE- 250/636: Performed by: NURSE ANESTHETIST, CERTIFIED REGISTERED

## 2022-01-13 PROCEDURE — 74011250636 HC RX REV CODE- 250/636: Performed by: INTERNAL MEDICINE

## 2022-01-13 PROCEDURE — 88305 TISSUE EXAM BY PATHOLOGIST: CPT

## 2022-01-13 PROCEDURE — 45380 COLONOSCOPY AND BIOPSY: CPT | Performed by: INTERNAL MEDICINE

## 2022-01-13 PROCEDURE — 74011000250 HC RX REV CODE- 250: Performed by: NURSE ANESTHETIST, CERTIFIED REGISTERED

## 2022-01-13 PROCEDURE — 76040000007: Performed by: INTERNAL MEDICINE

## 2022-01-13 RX ORDER — PROPOFOL 10 MG/ML
INJECTION, EMULSION INTRAVENOUS AS NEEDED
Status: DISCONTINUED | OUTPATIENT
Start: 2022-01-13 | End: 2022-01-13 | Stop reason: HOSPADM

## 2022-01-13 RX ORDER — LIDOCAINE HYDROCHLORIDE 20 MG/ML
INJECTION, SOLUTION EPIDURAL; INFILTRATION; INTRACAUDAL; PERINEURAL AS NEEDED
Status: DISCONTINUED | OUTPATIENT
Start: 2022-01-13 | End: 2022-01-13 | Stop reason: HOSPADM

## 2022-01-13 RX ORDER — SODIUM CHLORIDE 9 MG/ML
25 INJECTION, SOLUTION INTRAVENOUS CONTINUOUS
Status: DISCONTINUED | OUTPATIENT
Start: 2022-01-13 | End: 2022-01-13 | Stop reason: HOSPADM

## 2022-01-13 RX ORDER — SODIUM CHLORIDE 0.9 % (FLUSH) 0.9 %
5-40 SYRINGE (ML) INJECTION EVERY 8 HOURS
Status: DISCONTINUED | OUTPATIENT
Start: 2022-01-13 | End: 2022-01-13 | Stop reason: HOSPADM

## 2022-01-13 RX ORDER — SODIUM CHLORIDE 9 MG/ML
125 INJECTION, SOLUTION INTRAVENOUS CONTINUOUS
Status: DISCONTINUED | OUTPATIENT
Start: 2022-01-13 | End: 2022-01-13 | Stop reason: HOSPADM

## 2022-01-13 RX ORDER — SODIUM CHLORIDE 0.9 % (FLUSH) 0.9 %
5-40 SYRINGE (ML) INJECTION AS NEEDED
Status: DISCONTINUED | OUTPATIENT
Start: 2022-01-13 | End: 2022-01-13 | Stop reason: HOSPADM

## 2022-01-13 RX ADMIN — PROPOFOL 50 MG: 10 INJECTION, EMULSION INTRAVENOUS at 11:26

## 2022-01-13 RX ADMIN — PROPOFOL 50 MG: 10 INJECTION, EMULSION INTRAVENOUS at 11:10

## 2022-01-13 RX ADMIN — PROPOFOL 50 MG: 10 INJECTION, EMULSION INTRAVENOUS at 11:07

## 2022-01-13 RX ADMIN — LIDOCAINE HYDROCHLORIDE 40 MG: 20 INJECTION, SOLUTION EPIDURAL; INFILTRATION; INTRACAUDAL at 11:07

## 2022-01-13 RX ADMIN — SODIUM CHLORIDE 25 ML/HR: 9 INJECTION, SOLUTION INTRAVENOUS at 09:00

## 2022-01-13 RX ADMIN — PROPOFOL 50 MG: 10 INJECTION, EMULSION INTRAVENOUS at 11:11

## 2022-01-13 RX ADMIN — PROPOFOL 50 MG: 10 INJECTION, EMULSION INTRAVENOUS at 11:19

## 2022-01-13 RX ADMIN — PROPOFOL 50 MG: 10 INJECTION, EMULSION INTRAVENOUS at 11:15

## 2022-01-13 NOTE — DISCHARGE INSTRUCTIONS

## 2022-01-13 NOTE — OP NOTES
Colonoscopy Procedure Note      Patient: Jose J Anton MRN: [id number]  SSN: [id number]    YOB: 1935  Age: 80 y.o. Sex: female      Date of Procedure: 1/13/2022  Date/Time:  1/13/2022 11:42 AM       IMPRESSION:     1. Ascending colon polyps   2. Left-sided diverticulosis         RECOMMENDATIONS:     1) Check biopsy results. 2) Await pathology report. Call me in 2 weeks if you have not received any information from my office regarding your results. 3) Repeat colonoscopy in 2 to 3 years or as determined by the pathology report. INDICATION: History of colon polyps, diverticular disease     PROCEDURE PERFORMED: Colonoscopy with cold biopsy     DESCRIPTION OF PROCEDURE: An informed consent was obtained. The patient was placed in left lateral position. Perianal inspection and a digital rectal exam was performed. Video colonoscope was introduced into the rectum and advanced under direct vision up to the terminal ileum. With adequate insufflation and maneuvering of the withdrawing scope, the colonic mucosa was visualized carefully. Retroflexion was performed in the rectum to see the anorectum and also in the ascending colon to look behind the folds. Vital signs, pulse oximetry, single lead cardiac monitor were monitored throughout the procedure as the sedation was titrated to the desired effect ensuring patient comfort and safety. The patient tolerated the procedure very well and was transferred to the recovery area. Following is the summary of findings: In the ascending colon with 2 polyps the largest being 6 cm GERD removed via multiple cold biopsies. The second polyp which measured 0.6 cm was removed via multiple cold biopsies. In the descending and sigmoid colon with a few diverticula. The colon was somewhat stenotic in the sigmoid colon.      ENDOSCOPIST: Eder Wells MD      ENDOSCOPE: Olympus videocolonoscope     ASSISTANT:Circ-1: Bakari Sorenson              Scrub Tech-1: Robbi Cancer     ANESTHESIA: TIVA      QUALITY OF PREPARATION:      FINDINGS:   1. Ascending colon polyps  2.  Left-sided diverticulosis       Complications: None     EBL: Minimal     SPECIMENS:   ID Type Source Tests Collected by Time Destination   1 : polyps Preservative Colon, Ascending  Alee Lauren MD 1/13/2022 1126 Pathology             Xander Gutiérrez MD  January 13, 2022  11:42 AM

## 2022-01-13 NOTE — ANESTHESIA PREPROCEDURE EVALUATION
Relevant Problems   No relevant active problems       Anesthetic History   No history of anesthetic complications            Review of Systems / Medical History  Patient summary reviewed, nursing notes reviewed and pertinent labs reviewed    Pulmonary  Within defined limits                 Neuro/Psych   Within defined limits           Cardiovascular    Hypertension                   GI/Hepatic/Renal  Within defined limits              Endo/Other      Hypothyroidism       Other Findings              Physical Exam    Airway  Mallampati: I  TM Distance: 4 - 6 cm  Neck ROM: normal range of motion   Mouth opening: Normal     Cardiovascular  Regular rate and rhythm,  S1 and S2 normal,  no murmur, click, rub, or gallop             Dental  No notable dental hx       Pulmonary  Breath sounds clear to auscultation               Abdominal  Abdominal exam normal       Other Findings            Anesthetic Plan    ASA: 2  Anesthesia type: MAC    Monitoring Plan: Continuous noninvasive hemodynamic monitoring      Induction: Intravenous  Anesthetic plan and risks discussed with: Patient

## 2022-01-18 ENCOUNTER — TELEPHONE (OUTPATIENT)
Dept: GASTROENTEROLOGY | Age: 87
End: 2022-01-18

## 2022-01-18 NOTE — TELEPHONE ENCOUNTER
Patient notified that the polyp removed from her colon was benign. Anna Delaney should have a repeat colonoscopy in 2 years. Very happy to hear news.

## 2022-01-18 NOTE — TELEPHONE ENCOUNTER
----- Message from Kenya Steele MD sent at 1/17/2022 10:10 PM EST -----  Tell patient that the polyp removed from her colon was benign. She should have a repeat colonoscopy in 2 years.

## 2022-01-24 NOTE — ANESTHESIA POSTPROCEDURE EVALUATION
Procedure(s):  COLONOSCOPY (TIVA).     MAC    Anesthesia Post Evaluation      Multimodal analgesia: multimodal analgesia used between 6 hours prior to anesthesia start to PACU discharge  Patient location during evaluation: bedside  Patient participation: complete - patient participated  Level of consciousness: awake and alert  Pain score: 0  Pain management: adequate  Airway patency: patent  Anesthetic complications: no  Cardiovascular status: acceptable  Respiratory status: acceptable  Hydration status: acceptable  Post anesthesia nausea and vomiting:  none  Final Post Anesthesia Temperature Assessment:  Normothermia (36.0-37.5 degrees C)      INITIAL Post-op Vital signs:   Vitals Value Taken Time   /75 01/13/22 1225   Temp 37.1 °C (98.7 °F) 01/13/22 1140   Pulse 67 01/13/22 1225   Resp 20 01/13/22 1225   SpO2 95 % 01/13/22 1225

## 2022-02-11 ENCOUNTER — HOSPITAL ENCOUNTER (EMERGENCY)
Age: 87
Discharge: HOME | End: 2022-02-11
Attending: EMERGENCY MEDICINE
Payer: MEDICARE

## 2022-02-11 VITALS
HEIGHT: 61 IN | HEART RATE: 77 BPM | DIASTOLIC BLOOD PRESSURE: 71 MMHG | OXYGEN SATURATION: 99 % | TEMPERATURE: 97.7 F | BODY MASS INDEX: 26.43 KG/M2 | WEIGHT: 140 LBS | SYSTOLIC BLOOD PRESSURE: 153 MMHG | RESPIRATION RATE: 20 BRPM

## 2022-02-11 DIAGNOSIS — M25.562 ARTHRALGIA OF LEFT LOWER LEG: Primary | ICD-10-CM

## 2022-02-11 PROCEDURE — 99283 EMERGENCY DEPT VISIT LOW MDM: CPT

## 2022-02-11 PROCEDURE — 74011250637 HC RX REV CODE- 250/637: Performed by: EMERGENCY MEDICINE

## 2022-02-11 RX ADMIN — RIVAROXABAN 10 MG: 10 TABLET, FILM COATED ORAL at 18:05

## 2022-02-11 NOTE — DISCHARGE INSTRUCTIONS
Take medication as directed. Patient is to return to [facility] on Monday at 9:00 am for venous duplex doppler of left leg. And follow-up with primary care doctor on [facility]-[address] [place] [address].

## 2022-02-11 NOTE — ED TRIAGE NOTES
.  Chief Complaint   Patient presents with    Leg Pain     pt presents with c/o left leg pain that has been ongoing since yesterday. Pt states that the pain is intermitent. Pt rates pain 5/10.

## 2022-02-11 NOTE — ED PROVIDER NOTES
EMERGENCY DEPARTMENT HISTORY AND PHYSICAL EXAM      Date: 2/11/2022  Patient Name: Melinda Chairez    History of Presenting Illness     Chief Complaint   Patient presents with    Leg Pain     pt presents with c/o left leg pain that has been ongoing since yesterday. Pt states that the pain is intermitent. Pt rates pain 5/10. History Provided By: Patient    HPI: Melinda Chairez, 80 y.o. female with a past medical history significant hypertension and goiter presents to the ED with cc of leg pain since yesterday. Patient is in the calf off and on without swelling. Patient didn't take medication for the pain. No hx of Blood clots. Or Family hx of blood clots or recurrent trauma. She denies SOB or chest pain. There are no other complaints, changes, or physical findings at this time. PCP: Jenny Jordan MD    No current facility-administered medications on file prior to encounter. Current Outpatient Medications on File Prior to Encounter   Medication Sig Dispense Refill    ergocalciferol (ERGOCALCIFEROL) 1,250 mcg (50,000 unit) capsule Once a month      cholecalciferol (Vitamin D3) (1000 Units /25 mcg) tablet Take  by mouth daily. (Patient not taking: Reported on 12/20/2021)      dilTIAZem (CARDIZEM) 90 mg tablet Take 90 mg by mouth two (2) times a day.          Past History     Past Medical History:  Past Medical History:   Diagnosis Date    Diverticulitis     Goiter     Heart murmur 12/20/2021    HTN (hypertension)     Personal history of colonic polyps 12/20/2021       Past Surgical History:  Past Surgical History:   Procedure Laterality Date    COLONOSCOPY  2018    colon polyps    COLONOSCOPY  2012    COLONOSCOPY N/A 1/13/2022    COLONOSCOPY (TIVA) performed by Nima Reed [facility] at [facility] ENDOSCOPY    HX BREAST BIOPSY Left     benign    HX CYST REMOVAL      HX HYSTERECTOMY      HX THYROIDECTOMY  1984    had goiter removed       Family History:  Family History   Problem Relation Age of Onset    Thyroid Disease Mother     Cancer Father     Prostate Cancer Father        Social History:  Social History     Tobacco Use    Smoking status: Never Smoker    Smokeless tobacco: Never Used   Vaping Use    Vaping Use: Never used   Substance Use Topics    Alcohol use: No    Drug use: No       Allergies:  No Known Allergies      Review of Systems     Review of Systems   Constitutional: Negative. HENT: Negative. Eyes: Negative. Respiratory: Negative. Negative for shortness of breath. Cardiovascular: Negative. Negative for chest pain. Gastrointestinal: Negative. Endocrine: Negative. Genitourinary: Negative. Musculoskeletal: Negative. Left leg pain without swelling. Skin: Negative. Allergic/Immunologic: Negative. Neurological: Negative. Hematological: Negative. Psychiatric/Behavioral: Negative. Physical Exam     Physical Exam  Vitals and nursing note reviewed. Constitutional:       Appearance: Normal appearance. HENT:      Head: Normocephalic. Right Ear: Tympanic membrane normal.      Left Ear: Tympanic membrane normal.      Nose: Nose normal.      Mouth/Throat:      Mouth: Mucous membranes are moist.   Eyes:      Pupils: Pupils are equal, round, and reactive to light. Cardiovascular:      Rate and Rhythm: Normal rate. Pulses: Normal pulses. Pulmonary:      Effort: Pulmonary effort is normal.   Abdominal:      General: Abdomen is flat. Palpations: Abdomen is soft. Musculoskeletal:         General: Tenderness present. No swelling. Normal range of motion. Cervical back: Normal range of motion and neck supple. Comments: Left calf pain   Skin:     Capillary Refill: Capillary refill takes less than 2 seconds. Neurological:      General: No focal deficit present. Mental Status: She is alert.    Psychiatric:         Mood and Affect: Mood normal.         Lab and Diagnostic Study Results     Labs -   No results found for this or any previous visit (from the past 12 hour(s)). Radiologic Studies -   @lastxrresult@  CT Results  (Last 48 hours)    None        CXR Results  (Last 48 hours)    None            Medical Decision Making   - I am the first provider for this patient. - I reviewed the vital signs, available nursing notes, past medical history, past surgical history, family history and social history. - Initial assessment performed. The patients presenting problems have been discussed, and they are in agreement with the care plan formulated and outlined with them. I have encouraged them to ask questions as they arise throughout their visit. Vital Signs-Reviewed the patient's vital signs. Patient Vitals for the past 12 hrs:   Temp Pulse Resp BP SpO2   02/11/22 1615     99 %   02/11/22 1612 97.7 °F (36.5 °C) 77 20 (!) 153/71 99 %       Records Reviewed: Nursing Notes    The patient presents with leg pain with a differential diagnosis of DVT, cellulitis, trauma, contusion of left leg      ED Course:          Provider Notes (Medical Decision Making): MDM       Procedures   Medical Decision Makingedical Decision Making  Performed by: Mitchell Santos MD  PROCEDURES  Procedures       Disposition   Disposition: Condition stable  DC- Adult Discharges: All of the diagnostic tests were reviewed and questions answered. Diagnosis, care plan and treatment options were discussed. The patient understands the instructions and will follow up as directed. The patients results have been reviewed with them. They have been counseled regarding their diagnosis. The patient verbally convey understanding and agreement of the signs, symptoms, diagnosis, treatment and prognosis and additionally agrees to follow up as recommended with their PCP in 24 - 48 hours. They also agree with the care-plan and convey that all of their questions have been answered.   I have also put together some discharge instructions for them that include: 1) educational information regarding their diagnosis, 2) how to care for their diagnosis at home, as well a 3) list of reasons why they would want to return to the ED prior to their follow-up appointment, should their condition change. DISCHARGE PLAN:  1. Current Discharge Medication List      CONTINUE these medications which have NOT CHANGED    Details   ergocalciferol (ERGOCALCIFEROL) 1,250 mcg (50,000 unit) capsule Once a month      cholecalciferol (Vitamin D3) (1000 Units /25 mcg) tablet Take  by mouth daily. dilTIAZem (CARDIZEM) 90 mg tablet Take 90 mg by mouth two (2) times a day. 2.   Follow-up Information    None       3. Return to ED if worse   4. Current Discharge Medication List            Diagnosis     Clinical Impression:   Attestations:    Ysabel Gonzalez MD    Please note that this dictation was completed with [employer], the computer voice recognition software. Quite often unanticipated grammatical, syntax, homophones, and other interpretive errors are inadvertently transcribed by the computer software. Please disregard these errors. Please excuse any errors that have escaped final proofreading. Thank you.

## 2022-02-11 NOTE — ED NOTES
Pt given discharge and follow up instructions. Pt explained to return on 2/14/2022 for doppler study. Pt returned understanding. Pt advisedx to follow with PCP. Pt given education on anticoagulant use.

## 2022-02-14 ENCOUNTER — TRANSCRIBE ORDER (OUTPATIENT)
Dept: REGISTRATION | Age: 87
End: 2022-02-14

## 2022-02-14 ENCOUNTER — HOSPITAL ENCOUNTER (OUTPATIENT)
Dept: VASCULAR SURGERY | Age: 87
Discharge: HOME | End: 2022-02-14
Payer: MEDICARE

## 2022-02-14 DIAGNOSIS — R52 PAIN: ICD-10-CM

## 2022-02-14 DIAGNOSIS — R52 PAIN: Primary | ICD-10-CM

## 2022-02-14 PROCEDURE — 93971 EXTREMITY STUDY: CPT

## 2022-03-19 PROBLEM — E04.2 MULTINODULAR GOITER (NONTOXIC): Status: ACTIVE | Noted: 2018-06-26

## 2022-03-19 PROBLEM — R01.1 HEART MURMUR: Status: ACTIVE | Noted: 2021-12-20

## 2022-03-20 PROBLEM — Z86.0100 PERSONAL HISTORY OF COLONIC POLYPS: Status: ACTIVE | Noted: 2021-12-20

## 2022-03-20 PROBLEM — Z86.010 PERSONAL HISTORY OF COLONIC POLYPS: Status: ACTIVE | Noted: 2021-12-20

## 2022-07-09 ENCOUNTER — HOSPITAL ENCOUNTER (EMERGENCY)
Age: 87
Discharge: HOME | End: 2022-07-10
Attending: STUDENT IN AN ORGANIZED HEALTH CARE EDUCATION/TRAINING PROGRAM
Payer: MEDICARE

## 2022-07-09 DIAGNOSIS — R42 DIZZINESS: Primary | ICD-10-CM

## 2022-07-09 LAB
ALBUMIN SERPL-MCNC: 3.6 G/DL (ref 3.5–5)
ALBUMIN/GLOB SERPL: 1 {RATIO} (ref 1.1–2.2)
ALP SERPL-CCNC: 63 U/L (ref 45–117)
ALT SERPL-CCNC: 19 U/L (ref 12–78)
ANION GAP SERPL CALC-SCNC: 6 MMOL/L (ref 5–15)
AST SERPL W P-5'-P-CCNC: 21 U/L (ref 15–37)
BASOPHILS # BLD: 0.1 K/UL (ref 0–0.1)
BASOPHILS NFR BLD: 1 % (ref 0–1)
BILIRUB SERPL-MCNC: 0.4 MG/DL (ref 0.2–1)
BUN SERPL-MCNC: 24 MG/DL (ref 6–20)
BUN/CREAT SERPL: 20 (ref 12–20)
CA-I BLD-MCNC: 9 MG/DL (ref 8.5–10.1)
CHLORIDE SERPL-SCNC: 110 MMOL/L (ref 97–108)
CO2 SERPL-SCNC: 23 MMOL/L (ref 21–32)
CREAT SERPL-MCNC: 1.18 MG/DL (ref 0.55–1.02)
DIFFERENTIAL METHOD BLD: ABNORMAL
EOSINOPHIL # BLD: 0.2 K/UL (ref 0–0.4)
EOSINOPHIL NFR BLD: 3 % (ref 0–7)
ERYTHROCYTE [DISTWIDTH] IN BLOOD BY AUTOMATED COUNT: 11.9 % (ref 11.5–14.5)
GLOBULIN SER CALC-MCNC: 3.6 G/DL (ref 2–4)
GLUCOSE SERPL-MCNC: 96 MG/DL (ref 65–100)
HCT VFR BLD AUTO: 38.8 % (ref 35–47)
HGB BLD-MCNC: 12.9 G/DL (ref 11.5–16)
IMM GRANULOCYTES # BLD AUTO: 0 K/UL (ref 0–0.04)
IMM GRANULOCYTES NFR BLD AUTO: 0 % (ref 0–0.5)
LYMPHOCYTES # BLD: 3.4 K/UL (ref 0.8–3.5)
LYMPHOCYTES NFR BLD: 45 % (ref 12–49)
MCH RBC QN AUTO: 33.1 PG (ref 26–34)
MCHC RBC AUTO-ENTMCNC: 33.2 G/DL (ref 30–36.5)
MCV RBC AUTO: 99.5 FL (ref 80–99)
MONOCYTES # BLD: 0.8 K/UL (ref 0–1)
MONOCYTES NFR BLD: 10 % (ref 5–13)
NEUTS SEG # BLD: 3.1 K/UL (ref 1.8–8)
NEUTS SEG NFR BLD: 41 % (ref 32–75)
NRBC # BLD: 0 K/UL (ref 0–0.01)
NRBC BLD-RTO: 0 PER 100 WBC
PLATELET # BLD AUTO: 163 K/UL (ref 150–400)
PMV BLD AUTO: 11.6 FL (ref 8.9–12.9)
POTASSIUM SERPL-SCNC: 4.4 MMOL/L (ref 3.5–5.1)
PROT SERPL-MCNC: 7.2 G/DL (ref 6.4–8.2)
RBC # BLD AUTO: 3.9 M/UL (ref 3.8–5.2)
SODIUM SERPL-SCNC: 139 MMOL/L (ref 136–145)
WBC # BLD AUTO: 7.6 K/UL (ref 3.6–11)

## 2022-07-09 PROCEDURE — 36415 COLL VENOUS BLD VENIPUNCTURE: CPT

## 2022-07-09 PROCEDURE — 93005 ELECTROCARDIOGRAM TRACING: CPT

## 2022-07-09 PROCEDURE — 81001 URINALYSIS AUTO W/SCOPE: CPT

## 2022-07-09 PROCEDURE — 80053 COMPREHEN METABOLIC PANEL: CPT

## 2022-07-09 PROCEDURE — 85025 COMPLETE CBC W/AUTO DIFF WBC: CPT

## 2022-07-09 PROCEDURE — 99284 EMERGENCY DEPT VISIT MOD MDM: CPT

## 2022-07-10 VITALS
OXYGEN SATURATION: 99 % | TEMPERATURE: 97.5 F | HEART RATE: 55 BPM | SYSTOLIC BLOOD PRESSURE: 140 MMHG | DIASTOLIC BLOOD PRESSURE: 83 MMHG | RESPIRATION RATE: 15 BRPM | WEIGHT: 143 LBS | HEIGHT: 61 IN | BODY MASS INDEX: 27 KG/M2

## 2022-07-10 LAB
APPEARANCE UR: CLEAR
ATRIAL RATE: 52 BPM
BACTERIA URNS QL MICRO: ABNORMAL /HPF
BILIRUB UR QL: NEGATIVE
CALCULATED P AXIS, ECG09: 39 DEGREES
CALCULATED R AXIS, ECG10: 9 DEGREES
CALCULATED T AXIS, ECG11: 6 DEGREES
COLOR UR: ABNORMAL
DIAGNOSIS, 93000: NORMAL
GLUCOSE UR STRIP.AUTO-MCNC: NEGATIVE MG/DL
HGB UR QL STRIP: ABNORMAL
KETONES UR QL STRIP.AUTO: NEGATIVE MG/DL
LEUKOCYTE ESTERASE UR QL STRIP.AUTO: ABNORMAL
MUCOUS THREADS URNS QL MICRO: ABNORMAL /LPF
NITRITE UR QL STRIP.AUTO: NEGATIVE
P-R INTERVAL, ECG05: 150 MS
PH UR STRIP: 5 [PH] (ref 5–8)
PROT UR STRIP-MCNC: NEGATIVE MG/DL
Q-T INTERVAL, ECG07: 432 MS
QRS DURATION, ECG06: 68 MS
QTC CALCULATION (BEZET), ECG08: 401 MS
RBC #/AREA URNS HPF: ABNORMAL /HPF (ref 0–5)
SP GR UR REFRACTOMETRY: 1.01 (ref 1–1.03)
UROBILINOGEN UR QL STRIP.AUTO: 0.1 EU/DL (ref 0.1–1)
VENTRICULAR RATE, ECG03: 52 BPM
WBC URNS QL MICRO: ABNORMAL /HPF (ref 0–4)

## 2022-07-10 NOTE — ED PROVIDER NOTES
[facility]  EMERGENCY DEPARTMENT ENCOUNTER NOTE    Date: 7/9/2022  Patient Name: Lisa Wallis      History of Presenting Illness     Chief Complaint   Patient presents with    Fatigue    Dizziness       History Provided By: Patient    HPI: Lisa Wallis, 80 y.o. female with PMH of HTN presents to the ED with dizziness and numbness. #Dizziness: Intermittent going for approximately a year, happens when she is trying to reach over the counter to grab something and self resolved, nonexertional in nature, no specific aggravating relieving factors with no associated additional symptoms. No chest pain, shortness of breath, nausea, vomiting, or unsteadiness. #Numbness: Bilateral lower extremity in the posterior legs, happens when she wakes up from sleep for a minute or so and self resolved. No associated weakness. Currently not having any numbness. There are no other complaints, changes, or physical findings at this time. PCP: Isabel Mcclellan MD    Current Outpatient Medications   Medication Sig Dispense Refill    rivaroxaban (Xarelto) 10 mg tablet Take 1 Tablet by mouth daily for 3 days. Indications: deep vein thrombosis prevention 3 Tablet 0    ergocalciferol (ERGOCALCIFEROL) 1,250 mcg (50,000 unit) capsule Once a month      cholecalciferol (Vitamin D3) (1000 Units /25 mcg) tablet Take  by mouth daily. (Patient not taking: Reported on 12/20/2021)      dilTIAZem (CARDIZEM) 90 mg tablet Take 90 mg by mouth two (2) times a day.          Past History     Past Medical History:  Past Medical History:   Diagnosis Date    Diverticulitis     Goiter     Heart murmur 12/20/2021    HTN (hypertension)     Personal history of colonic polyps 12/20/2021       Past Surgical History:  Past Surgical History:   Procedure Laterality Date    COLONOSCOPY  2018    colon polyps    COLONOSCOPY  2012    COLONOSCOPY N/A 1/13/2022    COLONOSCOPY (TIVA) performed by Roxann Cowden, [facility] at [facility] ENDOSCOPY    HX BREAST BIOPSY Left     benign    HX CYST REMOVAL      HX HYSTERECTOMY      HX THYROIDECTOMY  1984    had goiter removed       Family History:  Family History   Problem Relation Age of Onset    Thyroid Disease Mother     Cancer Father     Prostate Cancer Father        Social History:  Social History     Tobacco Use    Smoking status: Never Smoker    Smokeless tobacco: Never Used   Vaping Use    Vaping Use: Never used   Substance Use Topics    Alcohol use: No    Drug use: No       Allergies:  No Known Allergies      Review of Systems     Review of Systems    A 10 point review of system was performed and was negative except as noted above in HPI    Physical Exam     Physical Exam  Vitals and nursing note reviewed. Constitutional:       General: She is not in acute distress. Appearance: She is well-developed. She is not diaphoretic. HENT:      Head: Normocephalic and atraumatic. Eyes:      Extraocular Movements: Extraocular movements intact. Conjunctiva/sclera: Conjunctivae normal.   Cardiovascular:      Rate and Rhythm: Normal rate and regular rhythm. Heart sounds: Normal heart sounds. Pulmonary:      Effort: Pulmonary effort is normal.      Breath sounds: Normal breath sounds. Abdominal:      Palpations: Abdomen is soft. Tenderness: There is no abdominal tenderness. Musculoskeletal:      Cervical back: Neck supple. Right lower leg: No tenderness. No edema. Left lower leg: No tenderness. No edema. Neurological:      General: No focal deficit present. Mental Status: She is alert and oriented to person, place, and time. Cranial Nerves: No cranial nerve deficit. Sensory: No sensory deficit. Motor: No weakness.          Lab and Diagnostic Study Results     Labs -     Recent Results (from the past 12 hour(s))   CBC WITH AUTOMATED DIFF    Collection Time: 07/09/22  9:59 PM   Result Value Ref Range    WBC 7.6 3.6 - 11.0 K/uL    RBC 3.90 3.80 - 5.20 M/uL    HGB 12.9 11.5 - 16.0 g/dL    HCT 38.8 35.0 - 47.0 %    MCV 99.5 (H) 80.0 - 99.0 FL    MCH 33.1 26.0 - 34.0 PG    MCHC 33.2 30.0 - 36.5 g/dL    RDW 11.9 11.5 - 14.5 %    PLATELET 803 000 - 852 K/uL    MPV 11.6 8.9 - 12.9 FL    NRBC 0.0 0.0  WBC    ABSOLUTE NRBC 0.00 0.00 - 0.01 K/uL    NEUTROPHILS 41 32 - 75 %    LYMPHOCYTES 45 12 - 49 %    MONOCYTES 10 5 - 13 %    EOSINOPHILS 3 0 - 7 %    BASOPHILS 1 0 - 1 %    IMMATURE GRANULOCYTES 0 0 - 0.5 %    ABS. NEUTROPHILS 3.1 1.8 - 8.0 K/UL    ABS. LYMPHOCYTES 3.4 0.8 - 3.5 K/UL    ABS. MONOCYTES 0.8 0.0 - 1.0 K/UL    ABS. EOSINOPHILS 0.2 0.0 - 0.4 K/UL    ABS. BASOPHILS 0.1 0.0 - 0.1 K/UL    ABS. IMM. GRANS. 0.0 0.00 - 0.04 K/UL    DF AUTOMATED     METABOLIC PANEL, COMPREHENSIVE    Collection Time: 07/09/22  9:59 PM   Result Value Ref Range    Sodium 139 136 - 145 mmol/L    Potassium 4.4 3.5 - 5.1 mmol/L    Chloride 110 (H) 97 - 108 mmol/L    CO2 23 21 - 32 mmol/L    Anion gap 6 5 - 15 mmol/L    Glucose 96 65 - 100 mg/dL    BUN 24 (H) 6 - 20 mg/dL    Creatinine 1.18 (H) 0.55 - 1.02 mg/dL    BUN/Creatinine ratio 20 12 - 20      GFR est AA 53 (L) >60 ml/min/1.73m2    GFR est non-AA 43 (L) >60 ml/min/1.73m2    Calcium 9.0 8.5 - 10.1 mg/dL    Bilirubin, total 0.4 0.2 - 1.0 mg/dL    AST (SGOT) 21 15 - 37 U/L    ALT (SGPT) 19 12 - 78 U/L    Alk.  phosphatase 63 45 - 117 U/L    Protein, total 7.2 6.4 - 8.2 g/dL    Albumin 3.6 3.5 - 5.0 g/dL    Globulin 3.6 2.0 - 4.0 g/dL    A-G Ratio 1.0 (L) 1.1 - 2.2     URINALYSIS W/MICROSCOPIC    Collection Time: 07/09/22 10:00 PM   Result Value Ref Range    Color Yellow/Straw      Appearance Clear Clear      Specific gravity 1.013 1.003 - 1.030      pH (UA) 5.0 5.0 - 8.0      Protein Negative Negative mg/dL    Glucose Negative Negative mg/dL    Ketone Negative Negative mg/dL    Bilirubin Negative Negative      Blood Moderate (A) Negative      Urobilinogen 0.1 0.1 - 1.0 EU/dL    Nitrites Negative Negative Leukocyte Esterase Trace (A) Negative      WBC 0-4 0 - 4 /hpf    RBC 0-5 0 - 5 /hpf    Bacteria 1+ (A) Negative /hpf    Mucus Trace /lpf       Radiologic Studies -   [unfilled]  CT Results  (Last 48 hours)    None        CXR Results  (Last 48 hours)    None          Medical Decision Making and ED Course   - I am the first and primary provider for this patient AND AM THE PRIMARY PROVIDER OF RECORD. - I reviewed the vital signs, available nursing notes, past medical history, past surgical history, family history and social history. - Initial assessment performed. The patients presenting problems have been discussed, and the staff are in agreement with the care plan formulated and outlined with them. I have encouraged them to ask questions as they arise throughout their visit. Vital Signs-Reviewed the patient's vital signs. Patient Vitals for the past 24 hrs:   Temp Pulse Resp BP SpO2   07/10/22 0046 97.5 °F (36.4 °C) (!) 55 15 (!) 140/83 99 %   07/09/22 2128 97.9 °F (36.6 °C) (!) 54 16 (!) 143/68 97 %       Records Reviewed: Nursing Notes    Provider Notes (Medical Decision Making):         ED Course as of 07/10/22 0146   Sat Jul 09, 2022   2210 #Dizziness: Appears to be recurrent in nature and chronic. There is no exertional component and seen by her primary care doctor in the past.  She reports the dizziness when she is reaching over to grab something. Does not appear to be vertiginous in origin. No symptoms that may suggest dehydration as well. We will get basic labs including CBC, chemistry and urinalysis. Additionally an EKG. #Numbness: Patient reports symptoms only when she wakes up from sleep that last briefly and resolved. Currently patient does not have any symptoms or numbness. There is no acuity to her symptoms. Would benefit from further evaluation by her primary care doctor.   Pending CBC, chemistry, and urinalysis for disposition. [AA]   Sun Jul 10, 2022   0045 No signs of dehydration. She has mild renal impairment of unknown chronicity. Trace of blood and urine without evidence of infection. Patient would benefit from expedited follow-up with her primary care doctor to get this worked up. Otherwise, no significant electrolyte derangement to explain her symptoms and no hyperglycemia. Given the chronic nature of her symptoms I think she would benefit from close follow-up. Discussed with the patient and son as well who was present at bedside will be able to help arrange follow-up. Anticipatory guidance return precaution discussed. [AA]      ED Course User Index  [AA] Elba Richards MD       Diagnosis     Clinical Impression:   1. Dizziness          Disposition     Disposition: Condition stable  DC- Adult Discharges: All of the diagnostic tests were reviewed and questions answered. Diagnosis, care plan and treatment options were discussed. The patient understands the instructions and will follow up as directed. The patients results have been reviewed with them. They have been counseled regarding their diagnosis. The patient verbally convey understanding and agreement of the signs, symptoms, diagnosis, treatment and prognosis and additionally agrees to follow up as recommended with their PCP in 24 - 48 hours. They also agree with the care-plan and convey that all of their questions have been answered. I have also put together some discharge instructions for them that include: 1) educational information regarding their diagnosis, 2) how to care for their diagnosis at home, as well a 3) list of reasons why they would want to return to the ED prior to their follow-up appointment, should their condition change. Discharged      DISCHARGE PLAN:  1.    Follow-up Information     Follow up With Specialties Details Why [facility]    [address] [facility] EMERGENCY DEPT Emergency Medicine Go to  As needed, If symptoms worsen [address] [facility] [address]  [address], Rosita Grace MD Family Medicine Schedule an appointment as soon as possible for a visit on 7/12/2022 For reevaluation, Discuss your visit to the ER [address]  [phone]          2. Return to ED if worse   3. Discharge Medication List as of 7/10/2022 12:36 AM            Attestations: Indra Marcial MD    Please note that this dictation was completed with [employer], the computer voice recognition software. Quite often unanticipated grammatical, syntax, homophones, and other interpretive errors are inadvertently transcribed by the computer software. Please disregard these errors. Please excuse any errors that have escaped final proofreading. Thank you.

## 2022-07-10 NOTE — ED TRIAGE NOTES
Ambulatory pt with steady gate c/o having tingling in her legs when she wakes up that goes away. Legs are not tingling at this time. C/o dizziness sometimes that comes and goes without any obvious pattern or anything that makes it worse or better.

## 2022-07-22 ENCOUNTER — TRANSCRIBE ORDER (OUTPATIENT)
Dept: SCHEDULING | Age: 87
End: 2022-07-22

## 2022-07-22 DIAGNOSIS — R42 DIZZINESS: Primary | ICD-10-CM

## 2022-08-02 ENCOUNTER — HOSPITAL ENCOUNTER (OUTPATIENT)
Dept: VASCULAR SURGERY | Age: 87
Discharge: HOME | End: 2022-08-02
Payer: MEDICARE

## 2022-08-02 DIAGNOSIS — R42 DIZZINESS: ICD-10-CM

## 2022-08-02 LAB
LEFT CCA DIST DIAS: 6 CM/S
LEFT CCA DIST SYS: 77.7 CM/S
LEFT CCA PROX DIAS: 12.5 CM/S
LEFT CCA PROX SYS: 104.9 CM/S
LEFT ECA SYS: 73.7 CM/S
LEFT ICA DIST DIAS: 15.8 CM/S
LEFT ICA DIST SYS: 78.8 CM/S
LEFT ICA MID DIAS: 12 CM/S
LEFT ICA MID SYS: 62.2 CM/S
LEFT ICA PROX DIAS: 9.8 CM/S
LEFT ICA PROX SYS: 75.2 CM/S
LEFT ICA/CCA SYS: 1.01 NO UNITS
LEFT VERTEBRAL SYS: 214.6 CM/S
RIGHT CCA DIST DIAS: 11.1 CM/S
RIGHT CCA DIST SYS: 96.3 CM/S
RIGHT CCA PROX DIAS: 15.1 CM/S
RIGHT CCA PROX SYS: 108.6 CM/S
RIGHT ECA SYS: 78.6 CM/S
RIGHT ICA DIST DIAS: 14.6 CM/S
RIGHT ICA DIST SYS: 65.3 CM/S
RIGHT ICA MID DIAS: 11.7 CM/S
RIGHT ICA MID SYS: 71.1 CM/S
RIGHT ICA PROX DIAS: 12.5 CM/S
RIGHT ICA PROX SYS: 90.2 CM/S
RIGHT ICA/CCA SYS: 0.9 NO UNITS
RIGHT VERTEBRAL DIAS: 6.78 CM/S
RIGHT VERTEBRAL SYS: 65.9 CM/S
VAS LEFT SUBCLAVIAN PROX EDV: 0.8 CM/S
VAS LEFT SUBCLAVIAN PROX PSV: 148.3 CM/S
VAS RIGHT SUBCLAVIAN PROX PSV: 217.1 CM/S

## 2022-08-02 PROCEDURE — 93880 EXTRACRANIAL BILAT STUDY: CPT

## 2022-08-09 ENCOUNTER — OFFICE VISIT (OUTPATIENT)
Dept: ENT CLINIC | Age: 87
End: 2022-08-09
Payer: MEDICARE

## 2022-08-09 VITALS
RESPIRATION RATE: 16 BRPM | BODY MASS INDEX: 27.19 KG/M2 | HEIGHT: 61 IN | SYSTOLIC BLOOD PRESSURE: 122 MMHG | DIASTOLIC BLOOD PRESSURE: 64 MMHG | WEIGHT: 144 LBS | HEART RATE: 66 BPM | OXYGEN SATURATION: 98 %

## 2022-08-09 DIAGNOSIS — E04.9 GOITER: Primary | ICD-10-CM

## 2022-08-09 DIAGNOSIS — Z90.09 HISTORY OF LOBECTOMY OF THYROID: ICD-10-CM

## 2022-08-09 PROCEDURE — 1123F ACP DISCUSS/DSCN MKR DOCD: CPT | Performed by: OTOLARYNGOLOGY

## 2022-08-09 PROCEDURE — 99203 OFFICE O/P NEW LOW 30 MIN: CPT | Performed by: OTOLARYNGOLOGY

## 2022-08-09 PROCEDURE — G8510 SCR DEP NEG, NO PLAN REQD: HCPCS | Performed by: OTOLARYNGOLOGY

## 2022-08-09 PROCEDURE — G8417 CALC BMI ABV UP PARAM F/U: HCPCS | Performed by: OTOLARYNGOLOGY

## 2022-08-09 PROCEDURE — G8536 NO DOC ELDER MAL SCRN: HCPCS | Performed by: OTOLARYNGOLOGY

## 2022-08-09 PROCEDURE — 1101F PT FALLS ASSESS-DOCD LE1/YR: CPT | Performed by: OTOLARYNGOLOGY

## 2022-08-09 PROCEDURE — G8427 DOCREV CUR MEDS BY ELIG CLIN: HCPCS | Performed by: OTOLARYNGOLOGY

## 2022-08-09 PROCEDURE — 31575 DIAGNOSTIC LARYNGOSCOPY: CPT | Performed by: OTOLARYNGOLOGY

## 2022-08-09 PROCEDURE — 1090F PRES/ABSN URINE INCON ASSESS: CPT | Performed by: OTOLARYNGOLOGY

## 2022-08-09 NOTE — LETTER
8/10/2022    Patient: Eric Ayala   YOB: 1935   Date of Visit: 8/9/2022     Fuad Yeung MD  [address]  [address] [facility] [address]  Via Fax: [phone]    Dear Fuad Yeung MD,      Thank you for referring Ms. Eric Ayala to [facility] EAR NOSE AND THROAT [facility] [address] for evaluation. My notes for this consultation are attached. If you have questions, please do not hesitate to call me. I look forward to following your patient along with you.       Sincerely,    Deon Jacobs MD

## 2022-08-09 NOTE — PROGRESS NOTES
Visit Vitals  /64 (BP 1 Location: Left upper arm, BP Patient Position: Sitting, BP Cuff Size: Large adult)   Pulse 66   Resp 16   Ht 5' 1\" (1.549 m)   Wt 144 lb (65.3 kg)   SpO2 98%   BMI 27.21 kg/m²     Chief Complaint   Patient presents with    New Patient     dizziness

## 2022-08-10 NOTE — PROGRESS NOTES
Otolaryngology-Head and Neck Surgery  New Patient Visit     Patient: Nydia Schwab  YOB: 1935  MRN: [id number]  Date of Service: 8/10/2022    Chief Complaint:  Goiter     History of Present Illness: Nydia Schwab is a 80y.o. year old female who presents today for discussion of thyroid goiter    Has a history of left thyroid lobectomy many years ago    In the 80's started to developed R thyroid swelling  Has had known goiter for many years and prior FNA 4-5 years ago which was benign    Denies dysphagia, odynophagia, voice change, compressive symptoms     Past Medical History:  Past Medical History:   Diagnosis Date    Diverticulitis     Goiter     Heart murmur 12/20/2021    HTN (hypertension)     Personal history of colonic polyps 12/20/2021       Past Surgical History:   Past Surgical History:   Procedure Laterality Date    COLONOSCOPY  2018    colon polyps    COLONOSCOPY  2012    COLONOSCOPY N/A 1/13/2022    COLONOSCOPY (TIVA) performed by Catalina Duron MD at [facility] ENDOSCOPY    HX BREAST BIOPSY Left     benign    HX CYST REMOVAL      HX HYSTERECTOMY      HX THYROIDECTOMY  1984    had goiter removed       Medications:   Current Outpatient Medications   Medication Instructions    cholecalciferol (VITAMIN D3) (1000 Units /25 mcg) tablet DAILY    dilTIAZem IR (CARDIZEM) 90 mg, Oral, 2 TIMES DAILY    ergocalciferol (ERGOCALCIFEROL) 1,250 mcg (50,000 unit) capsule Once a month    rivaroxaban (XARELTO) 10 mg, Oral, DAILY       Allergies:    Allergies   Allergen Reactions    Diltiazem Itching    Vitamin D3-Folic Acid Itching       Social History:   Social History     Tobacco Use    Smoking status: Never    Smokeless tobacco: Never   Vaping Use    Vaping Use: Never used   Substance Use Topics    Alcohol use: No    Drug use: No        Family History:  Family History   Problem Relation Age of Onset    Thyroid Disease Mother     Cancer Father     Prostate Cancer Father        Review of Systems:    Consitutional: denies fever, excessive weight gain or loss. Eyes: denies diplopia, eye pain. Integumentary: denies new concerning skin lesions. Ears, Nose, Mouth, Throat: denies except as per HPI. Endocrine: denies hot or cold intolerance, increased thirst.  Respiratory: denies cough, hemoptysis, wheezing  Gastrointestinal: denies trouble swallowing, nausea, emesis, regurgitation  Musculoskeletal: denies muscle weakness or wasting  Cardiovascular: denies chest pain, shortness of breath  Neurologic: denies seizures, numbness or tingling, syncope  Hematologic: denies easy bleeding or bruising    Physical Examination:   Vitals:    08/09/22 1254   BP: 122/64   BP 1 Location: Left upper arm   BP Patient Position: Sitting   BP Cuff Size: Large adult   Pulse: 66   Resp: 16   Height: 5' 1\" (1.549 m)   Weight: 144 lb (65.3 kg)   SpO2: 98%        General: Comfortable, pleasant, appears younger than stated age   Voice: Strong, speaking in full sentences, no stridor    Face: No masses or lesions, facial strength symmetric   Ears: External ears unremarkable. Bilateral ear canal clear. Tympanic membrane clear and intact, with visible landmarks. Clear middle ear space  Nose: External nose unremarkable. Dorsum midline. Anterior rhinoscopy demonstrates no lesions. Septum midline. Turbinates without hypertrophy. Oral Cavity / Oropharynx: No trismus. Mucosa pink and moist. No lesions. Tongue is midline and mobile. Palate elevates symmetrically. Uvula midline. Tonsils unremarkable. Base of tongue soft. Floor of mouth soft. Neck: Supple. No adenopathy. Large R thyroid goiter with deviation of her airway to the left. Palpable laryngeal landmarks. Full neck range of motion   Neurologic: CN II - XI intact.  Normal gait    PROCEDURE: FLEXIBLE FIBEROPTIC LARYNGOSCOPY    Preoperative Diagnosis: Goiter     Postoperative Diagnosis: Same    Procedure: Flexible Fiberoptic Laryngoscopy    Anesthesia: Topical 4% Lidocaine, Oxymetazoline    Description of Procedure: Verbal informed consent was obtained. After application of topical anesthetic and decongestant, the flexible fiberoptic endoscope was introduced to the patient's nare. It was passed through the nose and into the pharynx. The scope was then withdrawn and repeated on the opposing nare. The patient tolerated the procedure well. Findings: Normal nasal cavity, no polyposis or purulence. Middle meatus clear bilaterally. Nasopharynx without lesions. Base of tongue, vallecula & epiglottis unremarkable. Clear pyriform sinus. Vocal folds without lesions. Normal mobility. Visualized subglottis appears patent. Thyroid US  FINDINGS:     Right lobe: Right lobe is heterogeneous in echogenicity and measures 8.6 x 9.8 x  4.1 cm. There is normal vascular flow. No dominant nodule identified. The  trachea is deviated to the left. Left lobe: The left lobe has been surgically removed. Isthmus: The isthmus is heterogeneous and measures 7 mm in thickness. IMPRESSION  Goiter of the right lobe of the thyroid. Status post left thyroidectomy. Assessment and Plan:   R thyroid goiter  Hx left thyroid lobectomy  - Overall present for many years  - Has had FNA some years ago which was benign  - No dominant nodule on most recent US   - Will check another US to follow for size stability   - If overall stable, she's otherwise asymptomatic, could continue to follow given her age   - If seems to be increasing in size, would likely check CT neck and could consider R lobectomy         The patient was instructed to return to clinic if no improvement or progression of symptoms. Signs to watch out for reviewed.       MD En[address] [facility]  [address] [place] [address]  Office Phone: [phone]

## 2022-09-15 ENCOUNTER — TRANSCRIBE ORDER (OUTPATIENT)
Dept: SCHEDULING | Age: 87
End: 2022-09-15

## 2022-09-15 DIAGNOSIS — R31.29 MICROSCOPIC HEMATURIA: Primary | ICD-10-CM

## 2022-09-29 ENCOUNTER — TRANSCRIBE ORDER (OUTPATIENT)
Dept: SCHEDULING | Age: 87
End: 2022-09-29

## 2022-09-29 ENCOUNTER — HOSPITAL ENCOUNTER (OUTPATIENT)
Dept: CT IMAGING | Age: 87
Discharge: HOME | End: 2022-09-29
Payer: MEDICARE

## 2022-09-29 DIAGNOSIS — Z12.31 VISIT FOR SCREENING MAMMOGRAM: Primary | ICD-10-CM

## 2022-09-29 DIAGNOSIS — R31.29 MICROSCOPIC HEMATURIA: ICD-10-CM

## 2022-09-29 PROCEDURE — 74176 CT ABD & PELVIS W/O CONTRAST: CPT

## 2022-10-12 ENCOUNTER — HOSPITAL ENCOUNTER (OUTPATIENT)
Dept: MAMMOGRAPHY | Age: 87
Discharge: HOME | End: 2022-10-12
Payer: MEDICARE

## 2022-10-12 DIAGNOSIS — Z12.31 VISIT FOR SCREENING MAMMOGRAM: ICD-10-CM

## 2022-10-12 PROCEDURE — 77063 BREAST TOMOSYNTHESIS BI: CPT

## 2023-05-26 RX ORDER — DILTIAZEM HCL 90 MG
90 TABLET ORAL 2 TIMES DAILY
COMMUNITY

## 2023-05-26 RX ORDER — ERGOCALCIFEROL 1.25 MG/1
CAPSULE ORAL
COMMUNITY
Start: 2021-11-24

## 2023-10-10 ENCOUNTER — TRANSCRIBE ORDERS (OUTPATIENT)
Facility: HOSPITAL | Age: 88
End: 2023-10-10

## 2023-10-10 DIAGNOSIS — Z12.31 VISIT FOR SCREENING MAMMOGRAM: Primary | ICD-10-CM

## 2024-04-30 ENCOUNTER — TRANSCRIBE ORDERS (OUTPATIENT)
Facility: HOSPITAL | Age: 89
End: 2024-04-30

## 2024-04-30 DIAGNOSIS — R06.02 SOB (SHORTNESS OF BREATH): Primary | ICD-10-CM

## 2024-04-30 DIAGNOSIS — M79.606 PAIN OF LOWER EXTREMITY, UNSPECIFIED LATERALITY: ICD-10-CM

## 2024-05-10 ENCOUNTER — HOSPITAL ENCOUNTER (OUTPATIENT)
Facility: HOSPITAL | Age: 89
End: 2024-05-10
Payer: MEDICARE

## 2024-05-10 ENCOUNTER — HOSPITAL ENCOUNTER (OUTPATIENT)
Facility: HOSPITAL | Age: 89
Discharge: HOME | End: 2024-05-10
Payer: MEDICARE

## 2024-05-10 DIAGNOSIS — R06.02 SOB (SHORTNESS OF BREATH): ICD-10-CM

## 2024-05-10 DIAGNOSIS — M79.606 PAIN OF LOWER EXTREMITY, UNSPECIFIED LATERALITY: ICD-10-CM

## 2024-05-10 LAB
ECHO AO ROOT DIAM: 3.2 CM
ECHO AV AREA PEAK VELOCITY: 3.1 CM2
ECHO AV AREA VTI: 3.1 CM2
ECHO AV MEAN GRADIENT: 3 MMHG
ECHO AV MEAN VELOCITY: 0.9 M/S
ECHO AV PEAK GRADIENT: 6 MMHG
ECHO AV PEAK VELOCITY: 1.2 M/S
ECHO AV VELOCITY RATIO: 1
ECHO AV VTI: 24.9 CM
ECHO EST RA PRESSURE: 3 MMHG
ECHO LA DIAMETER: 1.9 CM
ECHO LA TO AORTIC ROOT RATIO: 0.59
ECHO LA VOL A-L A2C: 26 ML (ref 22–52)
ECHO LA VOL A-L A4C: 44 ML (ref 22–52)
ECHO LA VOL BP: 34 ML (ref 22–52)
ECHO LA VOL MOD A2C: 24 ML (ref 22–52)
ECHO LA VOL MOD A4C: 42 ML (ref 22–52)
ECHO LA VOLUME AREA LENGTH: 36 ML
ECHO LV E' LATERAL VELOCITY: 6 CM/S
ECHO LV E' SEPTAL VELOCITY: 6 CM/S
ECHO LV EDV A2C: 35 ML
ECHO LV EDV A4C: 45 ML
ECHO LV EDV BP: 40 ML (ref 56–104)
ECHO LV EJECTION FRACTION A2C: 74 %
ECHO LV EJECTION FRACTION A4C: 71 %
ECHO LV EJECTION FRACTION BIPLANE: 72 % (ref 55–100)
ECHO LV ESV A2C: 9 ML
ECHO LV ESV A4C: 13 ML
ECHO LV ESV BP: 11 ML (ref 19–49)
ECHO LV FRACTIONAL SHORTENING: 39 % (ref 28–44)
ECHO LV GLOBAL LONGITUDINAL STRAIN (GLS): -15.9 %
ECHO LV GLOBAL LONGITUDINAL STRAIN (GLS): -17.6 %
ECHO LV GLOBAL LONGITUDINAL STRAIN (GLS): -18 %
ECHO LV GLOBAL LONGITUDINAL STRAIN (GLS): -18.8 %
ECHO LV INTERNAL DIMENSION DIASTOLIC: 3.6 CM (ref 3.9–5.3)
ECHO LV INTERNAL DIMENSION SYSTOLIC: 2.2 CM
ECHO LV IVSD: 1.4 CM (ref 0.6–0.9)
ECHO LV MASS 2D: 169.8 G (ref 67–162)
ECHO LV POSTERIOR WALL DIASTOLIC: 1.3 CM (ref 0.6–0.9)
ECHO LV RELATIVE WALL THICKNESS RATIO: 0.72
ECHO LVOT AREA: 2.8 CM2
ECHO LVOT AV VTI INDEX: 1.06
ECHO LVOT DIAM: 1.9 CM
ECHO LVOT MEAN GRADIENT: 3 MMHG
ECHO LVOT PEAK GRADIENT: 6 MMHG
ECHO LVOT PEAK VELOCITY: 1.2 M/S
ECHO LVOT SV: 74.8 ML
ECHO LVOT VTI: 26.4 CM
ECHO MV A VELOCITY: 1.22 M/S
ECHO MV E DECELERATION TIME (DT): 203.6 MS
ECHO MV E VELOCITY: 0.8 M/S
ECHO MV E/A RATIO: 0.66
ECHO MV E/E' LATERAL: 13.33
ECHO MV E/E' RATIO (AVERAGED): 13.33
ECHO MV E/E' SEPTAL: 13.33
ECHO RA AREA 4C: 8.2 CM2
ECHO RIGHT VENTRICULAR SYSTOLIC PRESSURE (RVSP): 31 MMHG
ECHO RV INTERNAL DIMENSION: 1.8 CM
ECHO RV TAPSE: 2.3 CM (ref 1.7–?)
ECHO TV REGURGITANT MAX VELOCITY: 2.66 M/S
ECHO TV REGURGITANT PEAK GRADIENT: 28 MMHG
VAS LEFT ABI: 1.02
VAS LEFT ARM BP: 149 MMHG
VAS LEFT DORSALIS PEDIS BP: 151 MMHG
VAS LEFT PTA BP: 152 MMHG
VAS RIGHT ABI: 1.07
VAS RIGHT ARM BP: 142 MMHG
VAS RIGHT DORSALIS PEDIS BP: 159 MMHG
VAS RIGHT PTA BP: 158 MMHG

## 2024-05-10 PROCEDURE — 93922 UPR/L XTREMITY ART 2 LEVELS: CPT

## 2024-05-10 PROCEDURE — 93306 TTE W/DOPPLER COMPLETE: CPT

## 2024-10-22 ENCOUNTER — HOSPITAL ENCOUNTER (OUTPATIENT)
Facility: HOSPITAL | Age: 89
Discharge: HOME | End: 2024-10-25
Payer: MEDICARE

## 2024-10-22 VITALS — HEIGHT: 61 IN | BODY MASS INDEX: 24.55 KG/M2 | WEIGHT: 130 LBS

## 2024-10-22 DIAGNOSIS — Z12.31 VISIT FOR SCREENING MAMMOGRAM: ICD-10-CM

## 2024-10-22 PROCEDURE — 77063 BREAST TOMOSYNTHESIS BI: CPT

## 2024-11-23 ENCOUNTER — HOSPITAL ENCOUNTER (EMERGENCY)
Facility: HOSPITAL | Age: 89
Discharge: HOME | End: 2024-11-23
Attending: EMERGENCY MEDICINE
Payer: MEDICARE

## 2024-11-23 ENCOUNTER — APPOINTMENT (OUTPATIENT)
Facility: HOSPITAL | Age: 89
End: 2024-11-23
Attending: EMERGENCY MEDICINE
Payer: MEDICARE

## 2024-11-23 VITALS
HEART RATE: 68 BPM | SYSTOLIC BLOOD PRESSURE: 159 MMHG | DIASTOLIC BLOOD PRESSURE: 74 MMHG | TEMPERATURE: 97.9 F | HEIGHT: 61 IN | RESPIRATION RATE: 16 BRPM | OXYGEN SATURATION: 97 % | BODY MASS INDEX: 24.55 KG/M2 | WEIGHT: 130 LBS

## 2024-11-23 DIAGNOSIS — M79.605 LEFT LEG PAIN: Primary | ICD-10-CM

## 2024-11-23 PROCEDURE — 73590 X-RAY EXAM OF LOWER LEG: CPT

## 2024-11-23 PROCEDURE — 99283 EMERGENCY DEPT VISIT LOW MDM: CPT

## 2024-11-23 RX ORDER — ACETAMINOPHEN 500 MG
1000 TABLET ORAL
Status: DISCONTINUED | OUTPATIENT
Start: 2024-11-23 | End: 2024-11-23 | Stop reason: HOSPADM

## 2024-11-23 RX ORDER — IBUPROFEN 400 MG/1
400 TABLET, FILM COATED ORAL
Status: DISCONTINUED | OUTPATIENT
Start: 2024-11-23 | End: 2024-11-23 | Stop reason: HOSPADM

## 2024-11-23 ASSESSMENT — PAIN SCALES - GENERAL
PAINLEVEL_OUTOF10: 0
PAINLEVEL_OUTOF10: 0

## 2024-11-23 ASSESSMENT — PAIN - FUNCTIONAL ASSESSMENT
PAIN_FUNCTIONAL_ASSESSMENT: 0-10
PAIN_FUNCTIONAL_ASSESSMENT: 0-10

## 2024-11-23 NOTE — DISCHARGE INSTRUCTIONS
You were seen in the ER for your leg pain.  Thankfully, we did not find any signs of a dangerous cause like a blood flow problem or cancer or a broken bone. This could be from a nerve issue like neuropathy. You should follow up with your neurologist for further testing.    You can take tylenol or ibuprofen for aches and pains for the next few days. Tylenol is gentler on your system given your age. Take in plenty of water to stay hydrated and follow up with your primary care doctor in the next few days or the neurologist appointment. Return to the ER for any new or severe pain or any other new or concerning symptoms.        Thank you for choosing our Emergency Department for your care.  It is our privilege to care for you in your time of need.  In the next several days, you may receive a survey via email or mailed to your home about your experience with our team.  We would greatly appreciate you taking a few minutes to complete the survey, as we use this information to learn what we have done well and what we could be doing better. Thank you for trusting us with your care!    Below you will find a list of your tests from today's visit.   Labs  No results found for this or any previous visit (from the past 12 hour(s)).    Radiologic Studies  XR TIBIA FIBULA LEFT (2 VIEWS)   Final Result   No acute abnormality.      Electronically signed by LETICIA KAPOOR        ------------------------------------------------------------------------------------------------------------  The evaluation and treatment you received in the Emergency Department were for an urgent problem. It is important that you follow-up with a doctor, nurse practitioner, or physician assistant to:  (1) confirm your diagnosis,  (2) re-evaluation of changes in your illness and treatment, and (3) for ongoing care. Please take your discharge instructions with you when you go to your follow-up appointment.     If you have any problem arranging a follow-up  appointment, contact us!  If your symptoms become worse or you do not improve as expected, please return to us. We are available 24 hours a day.     If a prescription has been provided, please fill it as soon as possible to prevent a delay in treatment. If you have any questions or reservations about taking the medication due to side effects or interactions with other medications, please call your primary care provider or contact us directly.  Again, THANK YOU for choosing us to care for YOU!

## 2024-11-23 NOTE — ED PROVIDER NOTES
visualized and preliminarily interpreted by the ED Provider with the following findings: (SEE ED COURSE)    Interpretation per the Radiologist below, if available at the time of this note:  XR TIBIA FIBULA LEFT (2 VIEWS)   Final Result   No acute abnormality.      Electronically signed by LETICIA KAPOOR                       ED COURSE and DIFFERENTIAL DIAGNOSIS/MDM         8:15 AM Differential and Considerations: 89F w/leg pain. Good pulses. Slight decreased sensation suspicious for neuropathy and has appointment for same in 2 weeks. Will get XR to r/o fx/mass and give tylenol/ibuprofen for pain. Dispo w/neurology f/up pending negative XR.        Records Reviewed (source and summary of external notes): Prior medical records and Nursing notes.    Vitals:    Vitals:    11/23/24 0736 11/23/24 0737   BP: (!) 180/89    Pulse:  68   Resp:  16   Temp:  97.9 °F (36.6 °C)   SpO2: 94%    Weight:  59 kg (130 lb)   Height:  1.549 m (5' 1\")        ED COURSE  ED Course as of 11/23/24 0848   Sat Nov 23, 2024   0845 XR TIBIA FIBULA LEFT (2 VIEWS)  No fx or mass on my read. [YA]   0846 XR TIBIA FIBULA LEFT (2 VIEWS)    FINDINGS: AP and lateral  views of the left tibia and fibula demonstrate no  fracture or other acute osseous, articular or soft tissue abnormality.     IMPRESSION:  No acute abnormality.     Electronically signed by LETICIA KAPOOR        Exam Ended: 11/23/24 08:19 EST Last Resulted: 11/23/24 08:44 EST               [YA]      ED Course User Index  [YA] Leah Rodriguez MD         Patient was given the following medications:  Medications   acetaminophen (TYLENOL) tablet 1,000 mg (has no administration in time range)   ibuprofen (ADVIL;MOTRIN) tablet 400 mg (has no administration in time range)       CONSULTS: See ED Course/MDM for further details.         PROCEDURES   Unless otherwise noted above, none      CRITICAL CARE TIME        ED IMPRESSION     1. Left leg pain          DISPOSITION/PLAN   Discharge     PATIENT REFERRED  TO:  Neurologist  Go to your appointment on December 9 for nerve testing            DISCHARGE MEDICATIONS:     Medication List        ASK your doctor about these medications      dilTIAZem 90 MG tablet  Commonly known as: CARDIZEM     ergocalciferol 1.25 MG (93911 UT) capsule  Commonly known as: ERGOCALCIFEROL     rivaroxaban 10 MG Tabs tablet  Commonly known as: XARELTO     vitamin D 25 MCG (1000 UT) Tabs tablet  Commonly known as: CHOLECALCIFEROL                DISCONTINUED MEDICATIONS:  Current Discharge Medication List          I am the Primary Clinician of Record. Leah Rodriguez MD (electronically signed)    (Please note that parts of this dictation were completed with voice recognition software. Quite often unanticipated grammatical, syntax, homophones, and other interpretive errors are inadvertently transcribed by the computer software. Please disregards these errors. Please excuse any errors that have escaped final proofreading.)     Leah Rodriguez MD  11/23/24 6947

## (undated) DEVICE — PAD,PREPPING,CUFFED,24X48,7",NONSTERILE: Brand: MEDLINE

## (undated) DEVICE — TUBING, SUCTION, 9/32" X 10', STRAIGHT: Brand: MEDLINE

## (undated) DEVICE — SPONGE GZ W4XL4IN COT 12 PLY TYP VII WVN C FLD DSGN

## (undated) DEVICE — JELLY,LUBE,STERILE,FLIP TOP,TUBE,4-OZ: Brand: MEDLINE

## (undated) DEVICE — GLOVE ORANGE PI 7 1/2   MSG9075

## (undated) DEVICE — 1200CC GUARDIAN II: Brand: GUARDIAN

## (undated) DEVICE — FCPS RAD JAW 4LC 240CM W/NDL -- BX/20 RADIAL JAW 4

## (undated) DEVICE — WASH CLOTH INCONT LO LINT PREM 12X13 IN LF DISP

## (undated) DEVICE — SOLUTION IRRIG 1000ML STRL H2O USP PLAS POUR BTL